# Patient Record
Sex: FEMALE | Race: WHITE | NOT HISPANIC OR LATINO | URBAN - METROPOLITAN AREA
[De-identification: names, ages, dates, MRNs, and addresses within clinical notes are randomized per-mention and may not be internally consistent; named-entity substitution may affect disease eponyms.]

---

## 2017-08-25 ENCOUNTER — APPOINTMENT (RX ONLY)
Dept: URBAN - METROPOLITAN AREA CLINIC 116 | Facility: CLINIC | Age: 37
Setting detail: DERMATOLOGY
End: 2017-08-25

## 2017-08-25 DIAGNOSIS — Z71.89 OTHER SPECIFIED COUNSELING: ICD-10-CM

## 2017-08-25 DIAGNOSIS — Z80.8 FAMILY HISTORY OF MALIGNANT NEOPLASM OF OTHER ORGANS OR SYSTEMS: ICD-10-CM

## 2017-08-25 DIAGNOSIS — L91.8 OTHER HYPERTROPHIC DISORDERS OF THE SKIN: ICD-10-CM

## 2017-08-25 DIAGNOSIS — D22 MELANOCYTIC NEVI: ICD-10-CM

## 2017-08-25 PROBLEM — D22.5 MELANOCYTIC NEVI OF TRUNK: Status: ACTIVE | Noted: 2017-08-25

## 2017-08-25 PROBLEM — D22.61 MELANOCYTIC NEVI OF RIGHT UPPER LIMB, INCLUDING SHOULDER: Status: ACTIVE | Noted: 2017-08-25

## 2017-08-25 PROCEDURE — ? OBSERVATION

## 2017-08-25 PROCEDURE — ? COUNSELING

## 2017-08-25 PROCEDURE — 11200 RMVL SKIN TAGS UP TO&INC 15: CPT | Mod: NMN

## 2017-08-25 PROCEDURE — ? SKIN TAG REMOVAL

## 2017-08-25 PROCEDURE — 99203 OFFICE O/P NEW LOW 30 MIN: CPT | Mod: 25

## 2017-08-25 ASSESSMENT — LOCATION DETAILED DESCRIPTION DERM
LOCATION DETAILED: LEFT MEDIAL BREAST 9-10:00 REGION
LOCATION DETAILED: RIGHT AXILLARY VAULT
LOCATION DETAILED: EPIGASTRIC SKIN
LOCATION DETAILED: RIGHT DISTAL DORSAL SMALL FINGER
LOCATION DETAILED: LEFT AXILLARY VAULT
LOCATION DETAILED: LEFT INFERIOR POSTERIOR NECK
LOCATION DETAILED: RIGHT INFERIOR MEDIAL UPPER BACK

## 2017-08-25 ASSESSMENT — LOCATION ZONE DERM
LOCATION ZONE: FINGER
LOCATION ZONE: NECK
LOCATION ZONE: TRUNK
LOCATION ZONE: AXILLAE

## 2017-08-25 ASSESSMENT — LOCATION SIMPLE DESCRIPTION DERM
LOCATION SIMPLE: NECK
LOCATION SIMPLE: LEFT AXILLARY VAULT
LOCATION SIMPLE: LEFT BREAST
LOCATION SIMPLE: ABDOMEN
LOCATION SIMPLE: RIGHT UPPER BACK
LOCATION SIMPLE: RIGHT AXILLARY VAULT
LOCATION SIMPLE: RIGHT SMALL FINGER

## 2017-08-25 NOTE — PROCEDURE: SKIN TAG REMOVAL
Anesthesia Volume In Cc: 1
Medical Necessity Information: It is in your best interest to select a reason for this procedure from the list below. All of these items fulfill various CMS LCD requirements except the new and changing color options.
Include Z78.9 (Other Specified Conditions Influencing Health Status) As An Associated Diagnosis?: Yes
Detail Level: Detailed
Hemostasis: Aluminum Chloride
Medical Necessity Clause: This procedure was medically necessary because the lesions that were treated were:
Anesthesia Type: 1% lidocaine with epinephrine
Consent: Written consent obtained and the risks of skin tag removal was reviewed with the patient including but not limited to bleeding, pigmentary change, infection, pain, and remote possibility of scarring.

## 2017-08-25 NOTE — PROCEDURE: MIPS QUALITY
Quality 130: Documentation Of Current Medications In The Medical Record: Current Medications Documented
Quality 226: Preventive Care And Screening: Tobacco Use: Screening And Cessation Intervention: Patient screened for tobacco and never smoked
Quality 111:Pneumonia Vaccination Status For Older Adults: Pneumococcal Vaccination not Administered or Previously Received, Reason not Otherwise Specified
Quality 47: Advance Care Plan: Advance Care Planning discussed and documented in the medical record; patient did not wish or was not able to name a surrogate decision maker or provide an advance care plan.
Detail Level: Detailed
Quality 110: Preventive Care And Screening: Influenza Immunization: Influenza immunization was not ordered or administered, reason not given
Quality 131: Pain Assessment And Follow-Up: Pain assessment using a standardized tool is documented as negative, no follow-up plan required
Quality 431: Preventive Care And Screening: Unhealthy Alcohol Use - Screening: Patient screened for unhealthy alcohol use using a single question and scores 2 or greater episodes per year and brief intervention did not occur

## 2017-08-25 NOTE — HPI: EVALUATION OF SKIN LESION(S)
How Severe Are Your Spot(S)?: moderate
Have Your Spot(S) Been Treated In The Past?: has not been treated
Hpi Title: Evaluation of Skin Lesions
Family Member: Mother, father, aunts and uncles

## 2018-07-24 ENCOUNTER — APPOINTMENT (RX ONLY)
Dept: URBAN - METROPOLITAN AREA CLINIC 43 | Facility: CLINIC | Age: 38
Setting detail: DERMATOLOGY
End: 2018-07-24

## 2018-07-24 DIAGNOSIS — D22 MELANOCYTIC NEVI: ICD-10-CM

## 2018-07-24 DIAGNOSIS — D485 NEOPLASM OF UNCERTAIN BEHAVIOR OF SKIN: ICD-10-CM

## 2018-07-24 DIAGNOSIS — Z80.8 FAMILY HISTORY OF MALIGNANT NEOPLASM OF OTHER ORGANS OR SYSTEMS: ICD-10-CM

## 2018-07-24 PROBLEM — D48.5 NEOPLASM OF UNCERTAIN BEHAVIOR OF SKIN: Status: ACTIVE | Noted: 2018-07-24

## 2018-07-24 PROBLEM — D22.5 MELANOCYTIC NEVI OF TRUNK: Status: ACTIVE | Noted: 2018-07-24

## 2018-07-24 PROCEDURE — ? COUNSELING

## 2018-07-24 PROCEDURE — ? BIOPSY BY SHAVE METHOD

## 2018-07-24 PROCEDURE — 99214 OFFICE O/P EST MOD 30 MIN: CPT | Mod: 25

## 2018-07-24 PROCEDURE — 11100: CPT

## 2018-07-24 ASSESSMENT — LOCATION DETAILED DESCRIPTION DERM
LOCATION DETAILED: RIGHT INFERIOR MEDIAL UPPER BACK
LOCATION DETAILED: RIGHT PROXIMAL PRETIBIAL REGION
LOCATION DETAILED: EPIGASTRIC SKIN

## 2018-07-24 ASSESSMENT — LOCATION SIMPLE DESCRIPTION DERM
LOCATION SIMPLE: RIGHT UPPER BACK
LOCATION SIMPLE: RIGHT PRETIBIAL REGION
LOCATION SIMPLE: ABDOMEN

## 2018-07-24 ASSESSMENT — LOCATION ZONE DERM
LOCATION ZONE: TRUNK
LOCATION ZONE: LEG

## 2018-07-24 NOTE — PROCEDURE: BIOPSY BY SHAVE METHOD
Dressing: bandage
Notification Instructions: Patient will be notified of biopsy results. However, patient instructed to call the office if not contacted within 2 weeks.
Hemostasis: Drysol
Path Notes (To The Dermatopathologist): 0.5cm
Electrodesiccation And Curettage Text: The wound bed was treated with electrodesiccation and curettage after the biopsy was performed.
Post-Care Instructions: I reviewed with the patient in detail post-care instructions. Patient is to keep the biopsy site dry overnight, and then apply bacitracin twice daily until healed. Patient may apply hydrogen peroxide soaks to remove any crusting.
Destruction After The Procedure: No
Additional Anesthesia Volume In Cc (Will Not Render If 0): 0
Electrodesiccation Text: The wound bed was treated with electrodesiccation after the biopsy was performed.
Biopsy Method: Dermablade
Biopsy Type: H and E
Detail Level: Detailed
Lab: [id number] [facility] [address]
Type Of Destruction Used: Curettage
Silver Nitrate Text: The wound bed was treated with silver nitrate after the biopsy was performed.
Anesthesia Volume In Cc (Will Not Render If 0): 0.5
Cryotherapy Text: The wound bed was treated with cryotherapy after the biopsy was performed.
Depth Of Biopsy: dermis
Billing Type: [employer]
Anesthesia Type: 1% lidocaine with epinephrine
Consent: Written consent was obtained and risks were reviewed including but not limited to scarring, infection, bleeding, scabbing, incomplete removal, nerve damage and allergy to anesthesia.
Wound Care: Aquaphor
Was A Bandage Applied: Yes

## 2019-07-30 ENCOUNTER — APPOINTMENT (RX ONLY)
Dept: URBAN - METROPOLITAN AREA CLINIC 116 | Facility: CLINIC | Age: 39
Setting detail: DERMATOLOGY
End: 2019-07-30

## 2019-07-30 DIAGNOSIS — Z80.8 FAMILY HISTORY OF MALIGNANT NEOPLASM OF OTHER ORGANS OR SYSTEMS: ICD-10-CM

## 2019-07-30 DIAGNOSIS — L81.4 OTHER MELANIN HYPERPIGMENTATION: ICD-10-CM

## 2019-07-30 DIAGNOSIS — Z71.89 OTHER SPECIFIED COUNSELING: ICD-10-CM

## 2019-07-30 DIAGNOSIS — L30.1 DYSHIDROSIS [POMPHOLYX]: ICD-10-CM

## 2019-07-30 DIAGNOSIS — D22 MELANOCYTIC NEVI: ICD-10-CM

## 2019-07-30 PROBLEM — L30.9 DERMATITIS, UNSPECIFIED: Status: ACTIVE | Noted: 2019-07-30

## 2019-07-30 PROBLEM — D22.5 MELANOCYTIC NEVI OF TRUNK: Status: ACTIVE | Noted: 2019-07-30

## 2019-07-30 PROCEDURE — ? COUNSELING

## 2019-07-30 PROCEDURE — ? TREATMENT REGIMEN

## 2019-07-30 PROCEDURE — ? PRESCRIPTION

## 2019-07-30 PROCEDURE — 99214 OFFICE O/P EST MOD 30 MIN: CPT

## 2019-07-30 RX ORDER — CLOBETASOL PROPIONATE 0.5 MG/G
CREAM TOPICAL
Qty: 1 | Refills: 1 | Status: ERX | COMMUNITY
Start: 2019-07-30

## 2019-07-30 RX ADMIN — CLOBETASOL PROPIONATE 1: 0.5 CREAM TOPICAL at 00:00

## 2019-07-30 ASSESSMENT — LOCATION ZONE DERM
LOCATION ZONE: FEET
LOCATION ZONE: TRUNK

## 2019-07-30 ASSESSMENT — LOCATION DETAILED DESCRIPTION DERM
LOCATION DETAILED: RIGHT INFERIOR MEDIAL UPPER BACK
LOCATION DETAILED: LEFT HEEL
LOCATION DETAILED: EPIGASTRIC SKIN
LOCATION DETAILED: LOWER STERNUM
LOCATION DETAILED: RIGHT HEEL

## 2019-07-30 ASSESSMENT — LOCATION SIMPLE DESCRIPTION DERM
LOCATION SIMPLE: RIGHT HEEL
LOCATION SIMPLE: ABDOMEN
LOCATION SIMPLE: CHEST
LOCATION SIMPLE: RIGHT UPPER BACK
LOCATION SIMPLE: LEFT HEEL

## 2019-07-30 ASSESSMENT — SEVERITY ASSESSMENT: SEVERITY: MILD TO MODERATE

## 2019-07-30 NOTE — HPI: RASH
How Severe Is Your Rash?: mild
Is This A New Presentation, Or A Follow-Up?: Rash
Additional History: Patient states rash comes and goes

## 2019-07-30 NOTE — PROCEDURE: MIPS QUALITY
Additional Notes: Patient states on a scale of 0-10.  Pain level is 0. \\nMedication list detail not given
Quality 131: Pain Assessment And Follow-Up: Pain assessment using a standardized tool is documented as negative, no follow-up plan required
Quality 130: Documentation Of Current Medications In The Medical Record: Eligible clinician attests to documenting in the medical record the patient is not eligible for a current list of medications being obtained, updated, or reviewed by the eligible clinician
Detail Level: Detailed

## 2021-03-16 ENCOUNTER — APPOINTMENT (RX ONLY)
Dept: URBAN - METROPOLITAN AREA CLINIC 121 | Facility: CLINIC | Age: 41
Setting detail: DERMATOLOGY
End: 2021-03-16

## 2021-03-16 DIAGNOSIS — L20.89 OTHER ATOPIC DERMATITIS: ICD-10-CM | Status: STABLE

## 2021-03-16 DIAGNOSIS — L71.8 OTHER ROSACEA: ICD-10-CM

## 2021-03-16 PROBLEM — D23.5 OTHER BENIGN NEOPLASM OF SKIN OF TRUNK: Status: ACTIVE | Noted: 2021-03-16

## 2021-03-16 PROBLEM — L20.84 INTRINSIC (ALLERGIC) ECZEMA: Status: ACTIVE | Noted: 2021-03-16

## 2021-03-16 PROCEDURE — 99214 OFFICE O/P EST MOD 30 MIN: CPT

## 2021-03-16 PROCEDURE — ? COUNSELING

## 2021-03-16 PROCEDURE — ? PRESCRIPTION MEDICATION MANAGEMENT

## 2021-03-16 PROCEDURE — ? PRESCRIPTION

## 2021-03-16 RX ORDER — CLOBETASOL PROPIONATE 0.5 MG/G
CREAM TOPICAL BID
Qty: 1 | Refills: 4 | Status: ERX

## 2021-03-16 RX ORDER — METRONIDAZOLE 7.5 MG/ML
LOTION TOPICAL QD
Qty: 1 | Refills: 2 | Status: ERX | COMMUNITY
Start: 2021-03-16

## 2021-03-16 RX ADMIN — METRONIDAZOLE 1: 7.5 LOTION TOPICAL at 00:00

## 2021-03-16 ASSESSMENT — LOCATION DETAILED DESCRIPTION DERM: LOCATION DETAILED: RIGHT CENTRAL MALAR CHEEK

## 2021-03-16 ASSESSMENT — LOCATION ZONE DERM: LOCATION ZONE: FACE

## 2021-03-16 ASSESSMENT — LOCATION SIMPLE DESCRIPTION DERM: LOCATION SIMPLE: RIGHT CHEEK

## 2021-03-16 NOTE — PROCEDURE: PRESCRIPTION MEDICATION MANAGEMENT
Detail Level: Zone
Render In Strict Bullet Format?: No
Initiate Treatment: metronidazole 0.75 % lotion Qd\\nDays Supply: 30\\nSig: Apply to face daily
Initiate Treatment: clobetasol 0.05 % topical cream Bid\\nDays Supply: 30\\nSig: Apply to affected area twice a day x 2 weeks then break for 2 weeks Resume for flares

## 2022-03-15 ENCOUNTER — APPOINTMENT (RX ONLY)
Dept: URBAN - METROPOLITAN AREA CLINIC 121 | Facility: CLINIC | Age: 42
Setting detail: DERMATOLOGY
End: 2022-03-15

## 2022-03-15 DIAGNOSIS — Z80.8 FAMILY HISTORY OF MALIGNANT NEOPLASM OF OTHER ORGANS OR SYSTEMS: ICD-10-CM

## 2022-03-15 DIAGNOSIS — H01.13 ECZEMATOUS DERMATITIS OF EYELID: ICD-10-CM

## 2022-03-15 DIAGNOSIS — L81.4 OTHER MELANIN HYPERPIGMENTATION: ICD-10-CM

## 2022-03-15 DIAGNOSIS — L20.89 OTHER ATOPIC DERMATITIS: ICD-10-CM

## 2022-03-15 DIAGNOSIS — L71.8 OTHER ROSACEA: ICD-10-CM | Status: WELL CONTROLLED

## 2022-03-15 PROBLEM — H01.134 ECZEMATOUS DERMATITIS OF LEFT UPPER EYELID: Status: ACTIVE | Noted: 2022-03-15

## 2022-03-15 PROBLEM — L20.84 INTRINSIC (ALLERGIC) ECZEMA: Status: ACTIVE | Noted: 2022-03-15

## 2022-03-15 PROCEDURE — ? PRESCRIPTION MEDICATION MANAGEMENT

## 2022-03-15 PROCEDURE — ? PRESCRIPTION

## 2022-03-15 PROCEDURE — ? SUNSCREEN RECOMMENDATIONS

## 2022-03-15 PROCEDURE — 99214 OFFICE O/P EST MOD 30 MIN: CPT

## 2022-03-15 PROCEDURE — ? COUNSELING

## 2022-03-15 RX ORDER — HYDROCORTISONE 25 MG/G
OINTMENT TOPICAL BID
Qty: 28.35 | Refills: 1 | Status: ERX | COMMUNITY
Start: 2022-03-15

## 2022-03-15 RX ADMIN — HYDROCORTISONE 1: 25 OINTMENT TOPICAL at 00:00

## 2022-03-15 ASSESSMENT — LOCATION DETAILED DESCRIPTION DERM
LOCATION DETAILED: LEFT LATERAL SUPERIOR EYELID
LOCATION DETAILED: RIGHT POSTERIOR SHOULDER

## 2022-03-15 ASSESSMENT — LOCATION ZONE DERM
LOCATION ZONE: EYELID
LOCATION ZONE: ARM

## 2022-03-15 ASSESSMENT — LOCATION SIMPLE DESCRIPTION DERM
LOCATION SIMPLE: RIGHT SHOULDER
LOCATION SIMPLE: LEFT SUPERIOR EYELID

## 2022-03-15 NOTE — HPI: EVALUATION OF SKIN LESION(S)
What Type Of Note Output Would You Prefer (Optional)?: Standard Output
How Severe Are Your Spot(S)?: mild
Have Your Spot(S) Been Treated In The Past?: has not been treated
Hpi Title: Evaluation of Skin Lesions
Family Member: Gabbi Turner

## 2022-03-15 NOTE — PROCEDURE: PRESCRIPTION MEDICATION MANAGEMENT
Render In Strict Bullet Format?: No
Samples Given: Aquaphor healing ointment
Detail Level: Zone
Initiate Treatment: Hydrocortisone 2.5% lotion, apply to [facility] areas bid for two weeks on and two weeks off.
Continue Regimen: Metronidazole cream as directed.
Plan: Pt will call for refills if needed.
Continue Regimen: Clobetasol cream as directed.

## 2022-10-14 ENCOUNTER — RX ONLY (OUTPATIENT)
Age: 42
Setting detail: RX ONLY
End: 2022-10-14

## 2022-10-14 RX ORDER — CLOBETASOL PROPIONATE 0.5 MG/G
1 CREAM TOPICAL BID
Qty: 60 | Refills: 3 | Status: ERX

## 2022-10-14 RX ORDER — METRONIDAZOLE 7.5 MG/ML
1 LOTION TOPICAL QD
Qty: 59 | Refills: 3 | Status: ERX

## 2022-10-27 ENCOUNTER — APPOINTMENT (RX ONLY)
Dept: URBAN - METROPOLITAN AREA CLINIC 121 | Facility: CLINIC | Age: 42
Setting detail: DERMATOLOGY
End: 2022-10-27

## 2022-10-27 ENCOUNTER — RX ONLY (OUTPATIENT)
Age: 42
Setting detail: RX ONLY
End: 2022-10-27

## 2022-10-27 DIAGNOSIS — I87.2 VENOUS INSUFFICIENCY (CHRONIC) (PERIPHERAL): ICD-10-CM | Status: INADEQUATELY CONTROLLED

## 2022-10-27 PROCEDURE — ? RECOMMENDATIONS

## 2022-10-27 PROCEDURE — ? PRESCRIPTION

## 2022-10-27 PROCEDURE — ? MEDICAL CONSULTATION: VENOUS DISEASE

## 2022-10-27 PROCEDURE — ? COMPRESSION STOCKING FITTING

## 2022-10-27 PROCEDURE — 99204 OFFICE O/P NEW MOD 45 MIN: CPT

## 2022-10-27 RX ORDER — PEN NEEDLE, DIABETIC 32GX 5/32"
NEEDLE, DISPOSABLE MISCELLANEOUS
Qty: 1 | Refills: 0 | COMMUNITY
Start: 2022-10-27

## 2022-10-27 RX ADMIN — Medication: at 00:00

## 2022-10-27 ASSESSMENT — LOCATION ZONE DERM: LOCATION ZONE: LEG

## 2022-10-27 ASSESSMENT — LOCATION DETAILED DESCRIPTION DERM
LOCATION DETAILED: RIGHT PROXIMAL PRETIBIAL REGION
LOCATION DETAILED: LEFT DISTAL PRETIBIAL REGION

## 2022-10-27 ASSESSMENT — LOCATION SIMPLE DESCRIPTION DERM
LOCATION SIMPLE: RIGHT PRETIBIAL REGION
LOCATION SIMPLE: LEFT PRETIBIAL REGION

## 2022-10-27 NOTE — PROCEDURE: MEDICAL CONSULTATION: VENOUS DISEASE
Left Leg Varicose Veins: 0- None
Left Leg Circumference: medium
Include Left Vcss In Note: Yes
Right Leg Venous Hyperpigmentation: 0- None or focal low intensity (tan)
Right Dorsalis Pedis Pulse: not performed
Follow Up Instructions:: Patient will follow up after the bilateral duplex ultrasound venous reflux study to review the results and finalize treatment plan. The patient must wear compression stockings. Preventive strategies include weight loss through diet and exercise and toning leg muscles. A venous fact sheet was given, which reviews venous anatomy/pathophysiology and treatment options. The pathophysiology of venous disease and potential treatment options were discussed in detail, especially the non-FDA status of foam sclerotherapy with its risks benefits and alternatives. The patient's questions were answered in full.
Right Leg Compression Therapy: 0- None or noncompliant
Include Vcss In The Note?: No
Detail Level: Zone

## 2022-10-27 NOTE — PROCEDURE: COMPRESSION STOCKING FITTING
Left Thigh: 58 cm
Right Below Knee: 43 cm
Left Ankle: [facility]
Pantihose Type: Part A
Order Date: 10/27/22
Right Thigh: 59 cm
Left Below Knee: [address]
Stocking Type: Stockings
Strength: 20-30 mmHg
Detail Level: Simple
Toe Type: Open
Right Ankle: 24.5 cm

## 2022-10-27 NOTE — HPI: VEIN EVALUATION
Do You Have A Family History Of Vein Disease?: yes
How Severe Is/Are Your Symptoms?: moderate
Is This A New Presentation, Or A Follow-Up?: Vein Evaluation
Additional History: Hx brain mass, seizures, chronic migraines (taking Ubrelvy and another injectable medication)
Family History Of Vein Disease (Include Family Member And Type Of Vein Disease):: Grandmother, Mother, Norm Galeazzi
Referred By:: Self

## 2022-10-27 NOTE — PROCEDURE: RECOMMENDATIONS
Detail Level: Zone
Recommendations (Free Text): Pt advised to wear compression stockings as prescribed
Recommendation Preamble: The following recommendations were made during the visit:
Render Risk Assessment In Note?: no

## 2022-11-10 ENCOUNTER — APPOINTMENT (RX ONLY)
Dept: URBAN - METROPOLITAN AREA CLINIC 121 | Facility: CLINIC | Age: 42
Setting detail: DERMATOLOGY
End: 2022-11-10

## 2022-11-10 DIAGNOSIS — I87.2 VENOUS INSUFFICIENCY (CHRONIC) (PERIPHERAL): ICD-10-CM

## 2022-11-10 PROCEDURE — ? LOWER EXTREMITY DOPPLER US

## 2022-11-10 PROCEDURE — 93970 EXTREMITY STUDY: CPT

## 2022-11-10 NOTE — PROCEDURE: LOWER EXTREMITY DOPPLER US
Right Intraluminal Thrombus- Yes: The right deep veins were imaged from the level of the common femoral vein to the posterior tibial veins. There was evidence of intraluminal thrombus as noted above.
Recommend Sclerotherapy With Ultrasound Guidance On Left Side: No
Add Milliseconds Of Reflux To Note?: Yes
Left Intraluminal Thrombus- Yes: The left deep veins were imaged from the level of the common femoral vein to the posterior tibial veins. There was evidence of intraluminal thrombus as noted above.
Detail Level: Detailed
Right Intraluminal Thrombus- No: The right deep veins were imaged from the level of the common femoral vein to the posterior tibial veins. All deep veins demonstrated compressibility without evidence of intraluminal thrombus.
Continue Conservative Therapy Text: Continue conservative treatment (such as compression stockings, OTC analgesics, and exercise) and consider intervention if no change or worsening symptoms to varicosities.
Size Options: Use Range
Left Intraluminal Thrombus- No: The left deep veins were imaged from the level of the common femoral vein to the posterior tibial veins. All deep veins demonstrated compressibility without evidence of intraluminal thrombus.

## 2022-12-01 ENCOUNTER — APPOINTMENT (RX ONLY)
Dept: URBAN - METROPOLITAN AREA CLINIC 121 | Facility: CLINIC | Age: 42
Setting detail: DERMATOLOGY
End: 2022-12-01

## 2022-12-01 DIAGNOSIS — I87.2 VENOUS INSUFFICIENCY (CHRONIC) (PERIPHERAL): ICD-10-CM | Status: WORSENING

## 2022-12-01 PROCEDURE — 99212 OFFICE O/P EST SF 10 MIN: CPT

## 2022-12-01 PROCEDURE — ? MEDICAL CONSULTATION: VENOUS DISEASE

## 2022-12-01 NOTE — PROCEDURE: MEDICAL CONSULTATION: VENOUS DISEASE
Left Leg Venous Edema: 0- None
Left Dorsalis Pedis Pulse: 2 (Easily palpable)
Right Leg Venous Hyperpigmentation: 0- None or focal low intensity (tan)
Detail Level: Zone
Include Left Vcss In Note: Yes
Right Leg: Peripheral Vascular Disease?: No
Right Leg Compression Therapy: 0- None or noncompliant
Right Leg Circumference: medium
Limitations: Pt gets cramps at night, unable to enjoy daily activities due to tightness, aching, and discomfort in both legs.  Symptoms persist despite conservative therapies (leg elevation, compression stockings)
Follow Up Instructions:: Patient will follow up after the bilateral duplex ultrasound venous reflux study to review the results and finalize treatment plan. The patient must wear compression stockings. Preventive strategies include weight loss through diet and exercise and toning leg muscles. A venous fact sheet was given, which reviews venous anatomy/pathophysiology and treatment options. The pathophysiology of venous disease and potential treatment options were discussed in detail, with its risks, benefits and alternatives. The patient's questions were answered in full.

## 2023-02-14 ENCOUNTER — RX ONLY (OUTPATIENT)
Age: 43
Setting detail: RX ONLY
End: 2023-02-14

## 2023-02-16 ENCOUNTER — APPOINTMENT (RX ONLY)
Dept: URBAN - METROPOLITAN AREA CLINIC 121 | Facility: CLINIC | Age: 43
Setting detail: DERMATOLOGY
End: 2023-02-16

## 2023-02-16 DIAGNOSIS — I87.2 VENOUS INSUFFICIENCY (CHRONIC) (PERIPHERAL): ICD-10-CM | Status: WORSENING

## 2023-02-16 PROCEDURE — 99214 OFFICE O/P EST MOD 30 MIN: CPT

## 2023-02-16 PROCEDURE — ? PATIENT SPECIFIC COUNSELING

## 2023-02-16 PROCEDURE — ? VENOUS CLINICAL SEVERITY SCORE

## 2023-02-16 PROCEDURE — ? RECOMMENDATIONS

## 2023-02-16 PROCEDURE — ? VEIN TREATMENT PLAN

## 2023-02-16 PROCEDURE — ? CEAP CLASSIFICATION

## 2023-02-16 ASSESSMENT — LOCATION ZONE DERM: LOCATION ZONE: LEG

## 2023-02-16 ASSESSMENT — LOCATION SIMPLE DESCRIPTION DERM
LOCATION SIMPLE: RIGHT PRETIBIAL REGION
LOCATION SIMPLE: LEFT PRETIBIAL REGION

## 2023-02-16 ASSESSMENT — LOCATION DETAILED DESCRIPTION DERM
LOCATION DETAILED: LEFT DISTAL PRETIBIAL REGION
LOCATION DETAILED: RIGHT PROXIMAL PRETIBIAL REGION

## 2023-02-16 NOTE — HPI: VEIN EVALUATION
Which Leg Is Worse?: Left
How Severe Is/Are Your Symptoms?: moderate
Is This A New Presentation, Or A Follow-Up?: Follow Up Venous Evaluation

## 2023-02-16 NOTE — PROCEDURE: VENOUS CLINICAL SEVERITY SCORE
Right Leg Inflammation: 0- None
Left Leg Pigmentation: 0- None or focal low intensity (tan)
Include Right Vcss In Note: Yes
Right Leg Varicose Veins: 1- Few, scattered: branch varicose veins
Left Leg Venous Edema: 2- Afternoon edema above ankle
Right Leg Compression Therapy: 2- Wears elastic stocking most days
Detail Level: Simple
Right Leg Pain: 2- Daily, moderate activity limitation, occasional analgesics

## 2023-02-16 NOTE — PROCEDURE: VEIN TREATMENT PLAN
Micah Sessions - Left: 1
Ugs Sessions - Left: 2
Detail Level: Simple
Intro Statement (Will Not Render If Left Blank): The patient has signs and symptoms of chronic venous hypertension affecting daily function with US of the lower extremities demonstrating venous insufficiency. Extensive discussion and education was undertaken during the office visit regarding pathophysiology, chronicity and long term prognosis of venous disease. We also discussed risk factors for venous hypertension, diagnostic testing and treatment. Our treatment discussion included conservative management and interventional procedure options with an in-depth explanation of the procedures, recommendations and expected follow-up. All questions were answered and no further questions were verbalized by the patient at this time.
Closing Statement (Will Not Render If Left Blank): We discussed all treatment options. Risks and benefits of each procedure were discussed. These include but are not limited to: deep vein thrombosis, pulmonary embolism, paresthesia, phlebitis, infection, bleeding, and visible scarring. After the risks and benefits were reviewed with the patient and all of their questions were answered they decided to move forward with treatment. A patient education booklet was given and pre/post procedure instructions were reviewed with the patient.

## 2023-02-16 NOTE — PROCEDURE: RECOMMENDATIONS
Recommendation Preamble: The following recommendations were made during the visit:
Patient Management Risk Assessment: Moderate
Detail Level: Zone
Recommendations (Free Text): Patient has been instructed on pre/post procedure care instructions and potential risks. \\nPatient will schedule for procedure in 4 weeks to allow healing of a sprained metatarsal and to allow time for her upcoming vacation. \\nAfter discussion it was determined that patient is not able to continue wearing compression stockings daily due to her daily activities and job requirements which involves walking through mud and for long periods. \\nIt was decided that we will begin treatment on the RT GSV. \\nPatient will need large thigh high compression stockings as she is currently using knee high (will purchase on procedure day)\\nAuthorization is on file and expires 7/4/2023.
Render Risk Assessment In Note?: yes

## 2023-03-14 ENCOUNTER — APPOINTMENT (RX ONLY)
Dept: URBAN - METROPOLITAN AREA CLINIC 121 | Facility: CLINIC | Age: 43
Setting detail: DERMATOLOGY
End: 2023-03-14

## 2023-03-14 DIAGNOSIS — Z80.8 FAMILY HISTORY OF MALIGNANT NEOPLASM OF OTHER ORGANS OR SYSTEMS: ICD-10-CM

## 2023-03-14 DIAGNOSIS — D49.2 NEOPLASM OF UNSPECIFIED BEHAVIOR OF BONE, SOFT TISSUE, AND SKIN: ICD-10-CM

## 2023-03-14 DIAGNOSIS — L71.8 OTHER ROSACEA: ICD-10-CM | Status: WELL CONTROLLED

## 2023-03-14 DIAGNOSIS — L81.4 OTHER MELANIN HYPERPIGMENTATION: ICD-10-CM

## 2023-03-14 DIAGNOSIS — L20.89 OTHER ATOPIC DERMATITIS: ICD-10-CM | Status: WELL CONTROLLED

## 2023-03-14 PROBLEM — L20.84 INTRINSIC (ALLERGIC) ECZEMA: Status: ACTIVE | Noted: 2023-03-14

## 2023-03-14 PROCEDURE — ? SUNSCREEN RECOMMENDATIONS

## 2023-03-14 PROCEDURE — ? BIOPSY BY SHAVE METHOD

## 2023-03-14 PROCEDURE — 11102 TANGNTL BX SKIN SINGLE LES: CPT

## 2023-03-14 PROCEDURE — ? PRESCRIPTION MEDICATION MANAGEMENT

## 2023-03-14 PROCEDURE — ? COUNSELING

## 2023-03-14 PROCEDURE — 99214 OFFICE O/P EST MOD 30 MIN: CPT | Mod: 25

## 2023-03-14 ASSESSMENT — LOCATION DETAILED DESCRIPTION DERM
LOCATION DETAILED: RIGHT POSTERIOR SHOULDER
LOCATION DETAILED: LEFT LATERAL UPPER BACK

## 2023-03-14 ASSESSMENT — LOCATION SIMPLE DESCRIPTION DERM
LOCATION SIMPLE: LEFT UPPER BACK
LOCATION SIMPLE: RIGHT SHOULDER

## 2023-03-14 ASSESSMENT — LOCATION ZONE DERM
LOCATION ZONE: ARM
LOCATION ZONE: TRUNK

## 2023-03-14 NOTE — PROCEDURE: PRESCRIPTION MEDICATION MANAGEMENT
Detail Level: Zone
Render In Strict Bullet Format?: No
Continue Regimen: Metronidazole cream as directed.
Plan: Pt will call for refills if needed.
Continue Regimen: Clobetasol cream as directed.

## 2023-03-14 NOTE — HPI: EVALUATION OF SKIN LESION(S)
What Type Of Note Output Would You Prefer (Optional)?: Standard Output
How Severe Are Your Spot(S)?: mild
Have Your Spot(S) Been Treated In The Past?: has not been treated
Hpi Title: Evaluation of Skin Lesions
Family Member: Mother, Ildalandon Rosas

## 2023-03-14 NOTE — HPI: SKIN LESION
How Severe Is Your Skin Lesion?: mild
Has Your Skin Lesion Been Treated?: not been treated
Is This A New Presentation, Or A Follow-Up?: Skin Lesion
Which Family Member (Optional)?: Cindy Montes

## 2023-03-14 NOTE — PROCEDURE: BIOPSY BY SHAVE METHOD
Detail Level: Detailed
Depth Of Biopsy: dermis
Was A Bandage Applied: Yes
Size Of Lesion In Cm: 0.9
X Size Of Lesion In Cm: 0
Biopsy Type: H and E
Biopsy Method: Personna blade
Anesthesia Type: 1% lidocaine with epinephrine
Anesthesia Volume In Cc (Will Not Render If 0): 0.5
Hemostasis: Aluminum Chloride
Wound Care: Vaseline
Dressing: pressure dressing with telfa
Destruction After The Procedure: No
Type Of Destruction Used: Curettage
Cryotherapy Text: The wound bed was treated with cryotherapy after the biopsy was performed.
Electrodesiccation Text: The wound bed was treated with electrodesiccation after the biopsy was performed.
Electrodesiccation And Curettage Text: The wound bed was treated with electrodesiccation and curettage after the biopsy was performed.
Silver Nitrate Text: The wound bed was treated with silver nitrate after the biopsy was performed.
Lab: [facility]
Lab Facility: [address]
Path Notes (To The Dermatopathologist): Size is 0.8 cm
Consent: The provider's intent is to obtain a tissue sample solely for diagnostic purposes. Written consent to obtain tissue sample was obtained and risks were reviewed including but not limited to scarring, infection, bleeding, scabbing, incomplete removal, nerve damage and allergy to anesthesia.
Post-Care Instructions: Clean the wound with antibacterial soap and water twice a day. If crust develops, soak with moist gauze. Apply a thin layer of Vaseline to the area twice a day until wound is healed. Cover with a clean band-aid\\nBleeding is rare, but if it should occur, apply direct pressure to the bleeding site for a full 15 minutes without easing up. If the bleeding continues, despite your efforts, please call the office. If it is after hours, call [phone] to speak to a provider. It may be necessary to go to the emergency room. Patient also received handout.
Notification Instructions: Patient will be notified of biopsy results. However, patient instructed to call the office if not contacted within 2 weeks.
Billing Type: [employer]
Information: Selecting Yes will display possible errors in your note based on the variables you have selected. This validation is only offered as a suggestion for you. PLEASE NOTE THAT THE VALIDATION TEXT WILL BE REMOVED WHEN YOU FINALIZE YOUR NOTE. IF YOU WANT TO FAX A PRELIMINARY NOTE YOU WILL NEED TO TOGGLE THIS TO 'NO' IF YOU DO NOT WANT IT IN YOUR FAXED NOTE.

## 2023-03-16 ENCOUNTER — APPOINTMENT (RX ONLY)
Dept: URBAN - METROPOLITAN AREA CLINIC 121 | Facility: CLINIC | Age: 43
Setting detail: DERMATOLOGY
End: 2023-03-16

## 2023-03-16 DIAGNOSIS — I87.2 VENOUS INSUFFICIENCY (CHRONIC) (PERIPHERAL): ICD-10-CM

## 2023-03-16 PROCEDURE — ? ENDOVENOUS ABLATION

## 2023-03-16 PROCEDURE — ? RECOMMENDATIONS

## 2023-03-16 PROCEDURE — ? COMPRESSION STOCKING FITTING

## 2023-03-16 PROCEDURE — 36475 ENDOVENOUS RF 1ST VEIN: CPT | Mod: RT

## 2023-03-16 ASSESSMENT — LOCATION DETAILED DESCRIPTION DERM: LOCATION DETAILED: RIGHT ANTERIOR DISTAL THIGH

## 2023-03-16 ASSESSMENT — LOCATION ZONE DERM: LOCATION ZONE: LEG

## 2023-03-16 ASSESSMENT — LOCATION SIMPLE DESCRIPTION DERM: LOCATION SIMPLE: RIGHT THIGH

## 2023-03-16 NOTE — PROCEDURE: COMPRESSION STOCKING FITTING
Left Ankle: 25 cm
Right Below Knee: 45 cm
Specific Size Ordered: thigh high, XL
Left Thigh: 75 cm
Detail Level: Simple
Strength: 20-30 mmHg
Toe Type: Open
Stocking Type: Stockings
Order Date: 3/16/23
Right Thigh: 74 cm
Pantihose Type: Part A

## 2023-03-16 NOTE — PROCEDURE: RECOMMENDATIONS
Render Risk Assessment In Note?: no
Recommendation Preamble: The following recommendations were made during the visit:
Recommendations (Free Text): Compression dressings and stockings were placed on patient post-operatively. \\nPost care instructions were given to patient verbally and in written form. \\nPatient will schedule for limited venous ultrasound in one week post ablation of Right GSV. \\nContact info for our Vein Dept. and for Dr. Delano Spear were given to patient if there are any questions or concerns post procedure.
Detail Level: Zone

## 2023-03-16 NOTE — PROCEDURE: ENDOVENOUS ABLATION
Rf Access: Right distal thigh
Tumescent Anesthesia Volume In Cc: [address]
Number Of Catheters Used: 1
Rf Time (Include Units): Length: 15 cm
Rf Catheter Used?: RF Smart CF7-3-60
Medical Necessity Information: **LCD Guidelines vary from payer to payer. \\n**Please check with your payer's policy to determine medical necessity.
Disposition: Compression dressings and stocking(s) were placed post-operatively. Wound care instructions were given, verbal and written.
Medical Necessity Clause: This therapy was medically necessary as the patient has tried and failed conservative therapies including compression, leg elevation and avoiding prolonged standing. Patient also continues to have symptoms of chronic venous insufficiency with symptoms of aching, cramping at night, pain, swelling and tenderness, and is moderate in severity.
Tumescent Anesthesia Administered By (Optional): Dr. Karina Wood
Estimated Blood Loss (Cc): minimal
Detail Level: Detailed
Rf Temperature (Include Units): 120 celsius
Consent was obtained for the above procedure(s). \\nThis therapy was medically necessary because the patient has tried and failed conservative therapies including compression, leg elevation and avoiding prolonged standing. Patient also continues to have symptoms of chronic venous insufficiency with symptoms of aching, cramping at night, pain, swelling and tenderness and is moderate in severity.
Rf Cycles: 6
Hemostasis: Pressure
Rf Sheaths Used: ZAMZAM Introducer Set
Number Of Incisions Per Microphlebectomy: 0

## 2023-03-22 ENCOUNTER — APPOINTMENT (RX ONLY)
Dept: URBAN - METROPOLITAN AREA CLINIC 116 | Facility: CLINIC | Age: 43
Setting detail: DERMATOLOGY
End: 2023-03-22

## 2023-03-22 DIAGNOSIS — I87.2 VENOUS INSUFFICIENCY (CHRONIC) (PERIPHERAL): ICD-10-CM

## 2023-03-22 PROCEDURE — 36465 NJX NONCMPND SCLRSNT 1 VEIN: CPT | Mod: RT

## 2023-03-22 PROCEDURE — ? VEIN TREATMENT PLAN

## 2023-03-22 PROCEDURE — ? ADDITIONAL NOTES

## 2023-03-22 PROCEDURE — ? VARITHENA SCLEROTHERAPY

## 2023-03-22 PROCEDURE — ? LOWER EXTREMITY DOPPLER US

## 2023-03-22 PROCEDURE — ? DIAGNOSIS COMMENT

## 2023-03-22 ASSESSMENT — LOCATION SIMPLE DESCRIPTION DERM: LOCATION SIMPLE: RIGHT PRETIBIAL REGION

## 2023-03-22 ASSESSMENT — LOCATION ZONE DERM: LOCATION ZONE: LEG

## 2023-03-22 ASSESSMENT — LOCATION DETAILED DESCRIPTION DERM: LOCATION DETAILED: RIGHT PROXIMAL PRETIBIAL REGION

## 2023-03-22 NOTE — PROCEDURE: VARITHENA SCLEROTHERAPY
Consent was obtained with risks, benefits, and alternatives discussed for the above procedures.
Number Of Injections (Will Not Render If 0): 0
Expiration Date A (Month Year): 2/2024
Post-Care Instructions: Compression for the next 48 hours is critical to optimize your recovery and results. \\nCompliance with compression helps to prevent blood clots and minimizes pain, swelling, bruising, skin discoloration (staining) and the recurrence of vessels. Materials to gather for your wound care (all available over the counter): Compression stockings x 2 pairs, 4 x 4 gauze, Comprilan wrap: 8 cm and 10 cm width wrap, Medical adhesive tape. \\nCompression and Wound care;  Leg compression for 3 weeks is patel to your success and safety. Compression at night is only needed the first day. After that, compression is needed only during waking hours. However, if your leg feels better with compression at night, then you may continue compression at night as tolerated. After the sclerotherapy procedure, 2 layers compression will be placed. 1. On the skin, folded or flat gauze will cover the treated areas. 2. A compression wrap (Comprilan) will be wrapped around your leg over the gauze. Once the compression wrap is in place, a compression stocking will be worn. This two layer  compression (wrap plus stocking) should be worn for the first 24 hours if tolerated. 3. After 24 hours, remove all compressions and dressings and just wear the compression stockings only during waking hours. You will need to wear compression stockings for three weeks after your procedure, unless your physician instructs you otherwise. Activity: It is important NOT to be sitting or lying down for several hours after surgery. You may begin walking immediately after surgery. This is good for you, but take it easy. For 2 days after treatment: Avoid aerobic exercise, weight training, and all other types of exertion that increase your breathing and pulse rates. Do not get a tan for one month after sclerotherapy. Tanning increases your risk of skin discoloration. Bathing:       After 24 hours, you may shower or bathe in tepid water, but keep the compression stocking on. Avoid immersion in hot tubs. For pain or discomfort: You may take acetaminophen (TylenolTM, Extra-Strength TylenolTM or DatrilTM) as directed. Do not use aspirin, products containing aspirin, or ibuprofen (for example AdvilTM or MotrinTM) for five days after your surgery, unless approved by your physician. Dietary restrictions: Do not drink alcoholic beverages for two days after your sclerotherapy procedure. Possible side effects following sclerotherapy:  After sclerotherapy, mild swelling is expected. The injection sites may also become bruised or gray. You may also experience one or more of the following side effects, which almost always go away within one to four months:       Darkening of the injected veins. Brownish staining of the skin. Small clotted vessels under the surface of the skin that you can feel. Bruising of the injection sites:       What to do about bruising - This will resolve within 2-3 weeks. If you wish the bruising to disappear sooner, then applying Arnicare cream (over the counter, health food stores) will help. What to do if you feel small clotted vessels under the surface of the skin:       Call us for a follow up appointment. These small clots can be drained through a small nick. Draining these small clots will help you heal faster and with less discoloration. Contact your physician if you experience any of the following:       Treated areas become increasingly sore, tender, red or warm. Acetaminophen does not relieve your discomfort. Injection sites turn black or the skin around the site breaks down. Ulceration of the injection sites. You develop blisters from the tape. You develop significant swelling or pain in the leg. Darkening of large areas of the skin or foot.
Detail Level: Detailed
Sclerosant Volume (Cc): 4
Sclerosant (A) %: 1
Sclerosant (A): Varithena Foam
Lot # A: [id number]
Disposition: Compression stockings and short stretch elastic bandages were placed postoperatively.
Render Post Care In Note: No
Show Inventory Tab: Hide

## 2023-03-22 NOTE — PROCEDURE: VEIN TREATMENT PLAN
Add Completed Treatment Information: No
Micah Sessions - Left: 1
Ugs Sessions - Left: 2
Detail Level: Simple
Continue Conservative Therapy Text: The patient has signs and symptoms of chronic venous hypertension affecting daily function with ultrasound of the lower extremities demonstrating venous insufficiency. The patient will continue conservative treatment and has been counseled on avoiding prolonged standing, leg elevation, analgesics, exercise weight management, and daily graduated compression stockings use (20-30mmHg or higher).

## 2023-03-22 NOTE — PROCEDURE: ADDITIONAL NOTES
Additional Notes: Additional 6 cc used to treat R D GSV and refluxing tribs (1% foamed Polidocanol)\\nLot [id number], Exp 9/2024\\n\\nDfranco is s/p RFA ago R GSV 6 days ago. She reports a mild recovery, but still experiences aching, discomfort and tenderness. A limited u/s study was performed to confirm proper closure of R GSV. The scan also confirmed persistent reflux in R Distal GSV and tributaries. Discussed that our treatment today could take up to 7-10 days to see improvement in symptoms, but if they are not alleviated to RTC for UGS Tx #1 on RLE. Current treatment plan also includes RFA to L GSV, Varithena/UGS x 3 if needed, and RFA to R SSV. Patient agrees with this plan.
Detail Level: Simple
Render Risk Assessment In Note?: no

## 2023-03-22 NOTE — PROCEDURE: DIAGNOSIS COMMENT
Comment: Pt has hx migraines, including visual aura.  Explained increased risk of migraine triggered by tx, pt states she is not driving and has Ubrelvy on hand in event of migraine
Render Risk Assessment In Note?: no
Detail Level: Simple

## 2023-03-22 NOTE — PROCEDURE: LOWER EXTREMITY DOPPLER US
See Attached Documentation Text: Please refer to the attached ultrasound documentation for complete details of the procedure and the venous findings.
Continue Post-Procedural Therapy: Yes
Reflux Options: Use Range
Intraluminal Thrombus: No
Left Intraluminal Thrombus- No: The left deep veins were imaged from the level of the common femoral vein to the posterior tibial veins. All deep veins demonstrated compressibility without evidence of intraluminal thrombus.
Detail Level: Simple
Right Intraluminal Thrombus- No: The right deep veins were imaged from the level of the common femoral vein to the posterior tibial veins. All deep veins demonstrated compressibility without evidence of intraluminal thrombus.
Use - 'see Attached Documentation' Verbiage?: Yes - Will Include Text Below and Will Remove Other Portions of the Note
Continue Conservative Therapy Text: Continue conservative treatment (such as compression stockings, OTC analgesics, and exercise) and consider intervention if no change or worsening symptoms to varicosities.
Left Intraluminal Thrombus- Yes: The left deep veins were imaged from the level of the common femoral vein to the posterior tibial veins. There was evidence of intraluminal thrombus as noted above.
Right Intraluminal Thrombus- Yes: The right deep veins were imaged from the level of the common femoral vein to the posterior tibial veins. There was evidence of intraluminal thrombus as noted above.

## 2023-04-13 ENCOUNTER — APPOINTMENT (RX ONLY)
Dept: URBAN - METROPOLITAN AREA CLINIC 121 | Facility: CLINIC | Age: 43
Setting detail: DERMATOLOGY
End: 2023-04-13

## 2023-04-13 DIAGNOSIS — I87.2 VENOUS INSUFFICIENCY (CHRONIC) (PERIPHERAL): ICD-10-CM | Status: WORSENING

## 2023-04-13 PROCEDURE — 36475 ENDOVENOUS RF 1ST VEIN: CPT | Mod: LT

## 2023-04-13 PROCEDURE — ? ENDOVENOUS ABLATION

## 2023-04-13 PROCEDURE — ? RECOMMENDATIONS

## 2023-04-13 ASSESSMENT — LOCATION SIMPLE DESCRIPTION DERM: LOCATION SIMPLE: LEFT PRETIBIAL REGION

## 2023-04-13 ASSESSMENT — LOCATION DETAILED DESCRIPTION DERM: LOCATION DETAILED: LEFT MEDIAL PROXIMAL PRETIBIAL REGION

## 2023-04-13 ASSESSMENT — LOCATION ZONE DERM: LOCATION ZONE: LEG

## 2023-04-13 NOTE — PROCEDURE: ENDOVENOUS ABLATION
Number Of Catheters Used: 1
Rf Temperature (Include Units): 120 celsius
Rf Time (Include Units): Length: 35cm
Consent was obtained for the above procedure(s).
Disposition: Compression dressings and stockings were placed postoperatively. \\nWound care instructions were given, verbal and written.
Medical Necessity Clause: **This therapy was medically necessary as the patient has tried and failed conservative therapies including compression, leg elevation and avoiding prolonged standing. Patient also continues to have symptoms of chronic venous insufficiency with symptoms of aching, fatigue, itching, and swelling and is moderate to severe in severity noting left worse than right.
Tumescent Anesthesia Administered By (Optional): Dr. Abdullahi Angel
Rf Sheaths Used: ZAMZAM Introducer Set
Rf Catheter Used?: RF Smart CF7-7-60
Estimated Blood Loss (Cc): minimal
Number Of Incisions Per Microphlebectomy: 0
Rf Access: Left below knee
Medical Necessity Information: **LCD Guidelines vary from payer to payer. \\n**Please check with your payer's policy to determine medical necessity.
Hemostasis: Pressure
Detail Level: Detailed
Rf Cycles: 6
Tumescent Anesthesia Volume In Cc: [address]
Show Inventory: Show

## 2023-04-13 NOTE — PROCEDURE: RECOMMENDATIONS
Recommendation Preamble: The following recommendations were made during the visit:
Detail Level: Zone
Patient Management Risk Assessment: Moderate
Recommendations (Free Text): Compression dressings and stockings were placed on patient post-operatively. \\nPost care instructions were given to patient verbally and in written form. \\nPatient is already scheduled for limited venous ultrasound in one week post ablation of Left GSV and to perform RFA of RT SSV. \\nContact info for our Vein Dept. and for Dr. Yassine Sorto were given to patient if there are any questions or concerns post procedure. \\n\\n\\nPOST CARE INSTRUCTIONS:\\n*General: Post-operative instructions given and reviewed with patient verbally and in writing. \\n\\n*Activity: You MUST walk at least 10 minutes every hour you are awake for the first 48 hours. You do not have to wake up at night to walk. You MUST keep compression stockings on for 48 hours straight. Steri-strips can be removed 5 days post ablation. Avoid strenuous exercises and weight lifting over 30lbs for 3-5 days. Avoid flying or any long car trips for 2 weeks post procedure. \\n\\n*Compression and Wound Care: You may remove the compression stocking the morning of the second day after surgery. That is when you can shower as normal. If you are not having pain, you do not have to continue wearing the compression stockings. If you are having discomfort, the stockings may help and patient should continue to wear during the day for another 1-2 weeks, You may perform side lunges for thigh and calf stretching post ablation and Massaging the treated area, applying warm compress can also help with any discomfort. \\n\\n*Return Appointment: After the procedure, a return visit in 7 days is essential. An ultrasound examination will be done to confirm the successful treatment of your vein and to evaluate for any complications. \\n\\n*Bathing and Showering: After 48 hours, you may shower as normal, but still must avoid submersing in hot tubs, jacuzzi's or steam rooms for 1 week. Clean your surgery sites during showering and when you are finished bathing, pat your leg dry with a towel and repeat wound care instructions. For two weeks after treatment avoid submersing in hot tubs or hot baths. \\n\\n*Discomfort: Any pain or discomfort should decrease with each day after surgery. You may take over the counter medicines such as Aleve (500mg every 12 hours) or Tylenol (500mg every 6 hours) with food to alleviate any soreness, tightness and numbness which is normal after procedure and should dissipate over the next 1-2 weeks. If your pain is not better, please contact Dr. Yassine Sorto on his cell phone or our Vein Dept. at [phone].
Render Risk Assessment In Note?: yes
supervision

## 2023-04-20 ENCOUNTER — APPOINTMENT (RX ONLY)
Dept: URBAN - METROPOLITAN AREA CLINIC 121 | Facility: CLINIC | Age: 43
Setting detail: DERMATOLOGY
End: 2023-04-20

## 2023-04-20 DIAGNOSIS — I87.2 VENOUS INSUFFICIENCY (CHRONIC) (PERIPHERAL): ICD-10-CM | Status: UNCHANGED

## 2023-04-20 DIAGNOSIS — I83.89 VARICOSE VEINS OF LOWER EXTREMITIES WITH OTHER COMPLICATIONS: ICD-10-CM

## 2023-04-20 PROBLEM — I83.899 VARICOSE VEINS OF UNSPECIFIED LOWER EXTREMITY WITH OTHER COMPLICATIONS: Status: ACTIVE | Noted: 2023-04-20

## 2023-04-20 PROCEDURE — ? RECOMMENDATIONS

## 2023-04-20 PROCEDURE — ? ENDOVENOUS ABLATION

## 2023-04-20 PROCEDURE — 93971 EXTREMITY STUDY: CPT | Mod: 59,LT

## 2023-04-20 PROCEDURE — ? LOWER EXTREMITY DOPPLER US

## 2023-04-20 PROCEDURE — ? ADDITIONAL NOTES

## 2023-04-20 PROCEDURE — 36475 ENDOVENOUS RF 1ST VEIN: CPT | Mod: RT

## 2023-04-20 ASSESSMENT — LOCATION ZONE DERM: LOCATION ZONE: LEG

## 2023-04-20 ASSESSMENT — LOCATION DETAILED DESCRIPTION DERM: LOCATION DETAILED: RIGHT PROXIMAL CALF

## 2023-04-20 ASSESSMENT — LOCATION SIMPLE DESCRIPTION DERM: LOCATION SIMPLE: RIGHT CALF

## 2023-04-20 NOTE — PROCEDURE: LOWER EXTREMITY DOPPLER US
Add Milliseconds Of Reflux To Note?: Yes
Left Ssv Fragmentation And Discontinuity: No
Left Intraluminal Thrombus- Yes: The left deep veins were imaged from the level of the common femoral vein to the posterior tibial veins. There was evidence of intraluminal thrombus as noted above.
Continue Conservative Therapy Text: Continue conservative treatment (such as compression stockings, OTC analgesics, and exercise) and consider intervention if no change or worsening symptoms to varicosities.
Size Options: Use Range
Use - 'see Attached Documentation' Verbiage?: Yes - Will Include Text Below and Will Remove Other Portions of the Note
See Attached Documentation Text: Please refer to the attached ultrasound documentation for complete details of the procedure and the venous findings.
Left Intraluminal Thrombus- No: The left deep veins were imaged from the level of the common femoral vein to the posterior tibial veins. All deep veins demonstrated compressibility without evidence of intraluminal thrombus.
Right Intraluminal Thrombus- No: The right deep veins were imaged from the level of the common femoral vein to the posterior tibial veins. All deep veins demonstrated compressibility without evidence of intraluminal thrombus.
Right Intraluminal Thrombus- Yes: The right deep veins were imaged from the level of the common femoral vein to the posterior tibial veins. There was evidence of intraluminal thrombus as noted above.
Detail Level: Simple

## 2023-04-20 NOTE — PROCEDURE: RECOMMENDATIONS
Recommendation Preamble: The following recommendations were made during the visit:
Recommendations (Free Text): Patient advised of post procedure restrictions and limitations, and provided with written instructions. \\nContact info for our Vein Dept. and for Dr. Ara Cabral were given to patient if there are any questions or concerns post procedure. Will perform a limited ultrasound follow up in 1 week.
Detail Level: Zone
Render Risk Assessment In Note?: no

## 2023-04-20 NOTE — PROCEDURE: ADDITIONAL NOTES
Additional Notes: A limited ultrasound study was performed today s/p RFA of L GSV. Confirmed proper closure of L GSV prox/mid, no signs of DVT. Persistent reflux noted in L D GSV and associated tributaries, measurements taken today. Patient still reports aching/tenderness/cramps so will plan to treat with Varithena.
Detail Level: Simple
Render Risk Assessment In Note?: no

## 2023-04-20 NOTE — PROCEDURE: ENDOVENOUS ABLATION
Consent was obtained for the above procedure(s).
Rf Cycles: 4
Rf Access: right calf
Number Of Sheaths Used: 1
Medical Necessity Information: **LCD Guidelines vary from payer to payer. \\n**Please check with your payer's policy to determine medical necessity.
Estimated Blood Loss (Cc): minimal
Rf Sheaths Used: ZAMZAM Introducer Set
Tumescent Anesthesia Volume In Cc: [address] [facility]
Rf Time (Include Units): Length: 9cm
Number Of Incisions Per Microphlebectomy: 0
Rf Temperature (Include Units): 120 celsius
Detail Level: Detailed
Disposition: Compression dressings and stockings were placed postoperatively. Wound care instructions were given, verbally and in written form.
Tumescent Anesthesia Administered By (Optional): Dr. Suzie Worley
Hemostasis: Pressure
Rf Catheter Used?: RF Smart CF7-3-60
Medical Necessity Clause: This therapy was medically necessary as the patient has tried and failed conservative therapies including compression, leg elevation and avoiding prolonged standing. Patient also continues to have symptoms of chronic venous insufficiency with symptoms of aching, cramping at night, pain, swelling and tenderness, and is moderate in severity.

## 2023-04-27 ENCOUNTER — APPOINTMENT (RX ONLY)
Dept: URBAN - METROPOLITAN AREA CLINIC 121 | Facility: CLINIC | Age: 43
Setting detail: DERMATOLOGY
End: 2023-04-27

## 2023-04-27 DIAGNOSIS — I87.2 VENOUS INSUFFICIENCY (CHRONIC) (PERIPHERAL): ICD-10-CM

## 2023-04-27 PROCEDURE — 36465 NJX NONCMPND SCLRSNT 1 VEIN: CPT | Mod: LT

## 2023-04-27 PROCEDURE — ? PHOTO-DOCUMENTATION

## 2023-04-27 PROCEDURE — ? LOWER EXTREMITY DOPPLER US

## 2023-04-27 PROCEDURE — ? RECOMMENDATIONS

## 2023-04-27 PROCEDURE — 93971 EXTREMITY STUDY: CPT | Mod: 59,RT

## 2023-04-27 PROCEDURE — ? VARITHENA SCLEROTHERAPY

## 2023-04-27 PROCEDURE — ? MEDICAL CONSULTATION: VENOUS DISEASE

## 2023-04-27 PROCEDURE — 99214 OFFICE O/P EST MOD 30 MIN: CPT | Mod: 25,RT

## 2023-04-27 ASSESSMENT — LOCATION SIMPLE DESCRIPTION DERM
LOCATION SIMPLE: LEFT PRETIBIAL REGION
LOCATION SIMPLE: RIGHT CALF
LOCATION SIMPLE: RIGHT ANKLE
LOCATION SIMPLE: RIGHT PRETIBIAL REGION
LOCATION SIMPLE: RIGHT POPLITEAL SKIN

## 2023-04-27 ASSESSMENT — LOCATION DETAILED DESCRIPTION DERM
LOCATION DETAILED: RIGHT POPLITEAL SKIN
LOCATION DETAILED: RIGHT DISTAL CALF
LOCATION DETAILED: LEFT DISTAL PRETIBIAL REGION
LOCATION DETAILED: RIGHT POSTERIOR ANKLE
LOCATION DETAILED: RIGHT MEDIAL DISTAL PRETIBIAL REGION
LOCATION DETAILED: RIGHT PROXIMAL CALF

## 2023-04-27 ASSESSMENT — LOCATION ZONE DERM: LOCATION ZONE: LEG

## 2023-04-27 NOTE — PROCEDURE: MEDICAL CONSULTATION: VENOUS DISEASE
Left Leg Ulcer Diameter: 0- None
Left Leg Venous Hyperpigmentation: 0- None or focal low intensity (tan)
Left Leg Circumference: medium
Right Leg: Peripheral Vascular Disease?: No
Include Left Vcss In Note: Yes
Follow Up Instructions:: Patient presented today for their follow up and limited ultrasound s/p Right SSV RFA. \\nPatient was advised that they may continue to wear compression stockings as needed for any discomfort while recovering from radiofrequency ablation may wear up to 48 hours before their next scheduled procedure and is highly recommended for long car trips greater than 2 hours and when flying. \\nPreventative strategies were reviewed including weight loss through diet and exercise and toning leg muscles. A venous fact sheet was given, which reviews venous anatomy/pathophysiology of venous disease and potential treatment options were discussed in detail, with risks, benefits and alternatives. \\nThe patient's questions were answered in full.
Left Dorsalis Pedis Pulse: 2 (Easily palpable)
Detail Level: Detailed
Other Plan: Limited US post RF of Rt SSV today
Right Leg Compression Therapy: 0- None or noncompliant

## 2023-04-27 NOTE — PROCEDURE: RECOMMENDATIONS
Recommendation Preamble: The following recommendations were made during the visit:
Render Risk Assessment In Note?: yes
Detail Level: Zone
Recommendations (Free Text): Patient was treated with Demetrice Cardona on the Left D GSV today. \\nPost care instructions were reviewed with patient verbally and given in written form. \\nCompleted s/p US of RT SSV confirming no post procedure complications. \\nAdvised home coagulant management for a small area of the LT GSV post RFA in the medial thigh. \\nTreatment plan was reviewed with patient and determined we will proceed with scheduling for remaining treatment plan that includes Varithena and UGS.
Patient Management Risk Assessment: Moderate

## 2023-04-27 NOTE — PROCEDURE: VARITHENA SCLEROTHERAPY
Volume Of Varithena Foam Removed From Canister (Cc): 4
Post-Care Instructions: Compression for 3 weeks is critical to optimize your recovery and results. Compliance with compression helps to prevent blood clots and minimizes pain, swelling, bruising, skin discoloration (staining) and the recurrence of vessels. Materials to gather for your wound care (all available over the counter): Compression stockings x 2 pairs, 4 x 4 gauze, Comprilan wrap: 8 cm and 10 cm width wrap, Medical adhesive tape. Compression and Wound care;  Leg compression for 3 weeks is patel to your success and safety. Compression at night is only needed the first day. After that, compression is needed only during waking hours. However, if your leg feels better with compression at night, then you may continue compression at night as tolerated. After the sclerotherapy procedure, 2 layers compression will be placed. 1. On the skin, folded or flat gauze will cover the treated areas. 2. A compression wrap (Comprilan) will be wrapped around your leg over the gauze. Once the compression wrap is in place, a compression stocking will be worn. This two layer  compression (wrap plus stocking) should be worn for the first 24 hours if tolerated. 3. After 24 hours, remove all compressions and dressings and just wear the compression stockings only during waking hours. You will need to wear compression stockings for three weeks after your procedure, unless your physician instructs you otherwise. Activity: It is important NOT to be sitting or lying down for several hours after surgery. You may begin walking immediately after surgery. This is good for you, but take it easy. For 2 days after treatment: Avoid aerobic exercise, weight training, and all other types of exertion that increase your breathing and pulse rates. Do not get a tan for one month after sclerotherapy. Tanning increases your risk of skin discoloration. Bathing:       After 24 hours, you may shower or bathe in tepid water, but keep the compression stocking on. Avoid immersion in hot tubs. For pain or discomfort: You may take acetaminophen (TylenolTM, Extra-Strength TylenolTM or DatrilTM) as directed. Do not use aspirin, products containing aspirin, or ibuprofen (for example AdvilTM or MotrinTM) for five days after your surgery, unless approved by your physician. Dietary restrictions: Do not drink alcoholic beverages for two days after your sclerotherapy procedure. Possible side effects following sclerotherapy:  After sclerotherapy, mild swelling is expected. The injection sites may also become bruised or gray. You may also experience one or more of the following side effects, which almost always go away within one to four months:       Darkening of the injected veins. Brownish staining of the skin. Small clotted vessels under the surface of the skin that you can feel. Bruising of the injection sites:       What to do about bruising - This will resolve within 2-3 weeks. If you wish the bruising to disappear sooner, then applying Arnicare cream (over the counter, health food stores) will help. What to do if you feel small clotted vessels under the surface of the skin:       Call us for a follow up appointment. These small clots can be drained through a small nick. Draining these small clots will help you heal faster and with less discoloration. Contact your physician if you experience any of the following:       Treated areas become increasingly sore, tender, red or warm. Acetaminophen does not relieve your discomfort. Injection sites turn black or the skin around the site breaks down. Ulceration of the injection sites. You develop blisters from the tape. You develop significant swelling or pain in the leg. Darkening of large areas of the skin or foot.
Disposition: Compression stockings and short stretch elastic bandages were placed postoperatively.
Lot # A: [id number]
Expiration Date A (Month Year): 03/2024
Detail Level: Detailed
Sclerosant (A) %: 1
Sclerosant (A): Varithena Foam
Render Post Care In Note: No
Consent was obtained with risks, benefits, and alternatives discussed for the above procedures.
Number Of Injections (Will Not Render If 0): 0
Sclerosant Volume (Cc): 3
Show Inventory Tab: Hide

## 2023-05-11 ENCOUNTER — APPOINTMENT (RX ONLY)
Dept: URBAN - METROPOLITAN AREA CLINIC 121 | Facility: CLINIC | Age: 43
Setting detail: DERMATOLOGY
End: 2023-05-11

## 2023-05-11 DIAGNOSIS — I87.2 VENOUS INSUFFICIENCY (CHRONIC) (PERIPHERAL): ICD-10-CM

## 2023-05-11 PROCEDURE — ? VARITHENA SCLEROTHERAPY

## 2023-05-11 PROCEDURE — 36465 NJX NONCMPND SCLRSNT 1 VEIN: CPT | Mod: RT

## 2023-05-11 PROCEDURE — ? RECOMMENDATIONS

## 2023-05-11 ASSESSMENT — LOCATION DETAILED DESCRIPTION DERM: LOCATION DETAILED: RIGHT POSTERIOR ANKLE

## 2023-05-11 ASSESSMENT — LOCATION ZONE DERM: LOCATION ZONE: LEG

## 2023-05-11 ASSESSMENT — LOCATION SIMPLE DESCRIPTION DERM: LOCATION SIMPLE: RIGHT ANKLE

## 2023-05-11 NOTE — PROCEDURE: RECOMMENDATIONS
Detail Level: Zone
Render Risk Assessment In Note?: yes
Recommendations (Free Text): Patient was treated with Varithena on RT Distal SSV today. \\nPost care instructions were reviewed with patient verbally and given in written form. \\nPatients treatment plan was reviewed and we will proceed with scheduling for UGS LT D GSV TRIBS. \\nRecommended use of dermaka cream 2-3 times daily for the next 2-3 weeks to prevent staining (patient declined to purchase today, stating she knows someone who has some and will borrow)
Patient Management Risk Assessment: Moderate
Recommendation Preamble: The following recommendations were made during the visit:

## 2023-05-11 NOTE — PROCEDURE: VARITHENA SCLEROTHERAPY
Number Of Injections (Will Not Render If 0): 0
Post-Care Instructions: Compression for the next 48 hours is critical to optimize your recovery and results. \\nCompliance with compression helps to prevent blood clots and minimizes pain, swelling, bruising, skin discoloration (staining) and the recurrence of vessels. Materials to gather for your wound care (all available over the counter): Compression stockings x 2 pairs, 4 x 4 gauze, Comprilan wrap: 8 cm and 10 cm width wrap, Medical adhesive tape. \\nCompression and Wound care;  Leg compression for 3 weeks is patel to your success and safety. Compression at night is only needed the first day. After that, compression is needed only during waking hours. However, if your leg feels better with compression at night, then you may continue compression at night as tolerated. After the sclerotherapy procedure, 2 layers compression will be placed. 1. On the skin, folded or flat gauze will cover the treated areas. 2. A compression wrap (Comprilan) will be wrapped around your leg over the gauze. Once the compression wrap is in place, a compression stocking will be worn. This two layer  compression (wrap plus stocking) should be worn for the first 24 hours if tolerated. 3. After 24 hours, remove all compressions and dressings and just wear the compression stockings only during waking hours. You will need to wear compression stockings for three weeks after your procedure, unless your physician instructs you otherwise. Activity: It is important NOT to be sitting or lying down for several hours after surgery. You may begin walking immediately after surgery. This is good for you, but take it easy. For 2 days after treatment: Avoid aerobic exercise, weight training, and all other types of exertion that increase your breathing and pulse rates. Do not get a tan for one month after sclerotherapy. Tanning increases your risk of skin discoloration. Bathing:       After 24 hours, you may shower or bathe in tepid water, but keep the compression stocking on. Avoid immersion in hot tubs. For pain or discomfort: You may take acetaminophen (TylenolTM, Extra-Strength TylenolTM or DatrilTM) as directed. Do not use aspirin, products containing aspirin, or ibuprofen (for example AdvilTM or MotrinTM) for five days after your surgery, unless approved by your physician. Dietary restrictions: Do not drink alcoholic beverages for two days after your sclerotherapy procedure. Possible side effects following sclerotherapy:  After sclerotherapy, mild swelling is expected. The injection sites may also become bruised or gray. You may also experience one or more of the following side effects, which almost always go away within one to four months:       Darkening of the injected veins. Brownish staining of the skin. Small clotted vessels under the surface of the skin that you can feel. Bruising of the injection sites:       What to do about bruising - This will resolve within 2-3 weeks. If you wish the bruising to disappear sooner, then applying Arnicare cream (over the counter, health food stores) will help. What to do if you feel small clotted vessels under the surface of the skin:       Call us for a follow up appointment. These small clots can be drained through a small nick. Draining these small clots will help you heal faster and with less discoloration. Contact your physician if you experience any of the following:       Treated areas become increasingly sore, tender, red or warm. Acetaminophen does not relieve your discomfort. Injection sites turn black or the skin around the site breaks down. Ulceration of the injection sites. You develop blisters from the tape. You develop significant swelling or pain in the leg. Darkening of large areas of the skin or foot.
Sclerosant Volume (Cc): 2
Sclerosant (A) %: 1
Sclerosant (A): Varithena Foam
Lot # A: [id number]
Disposition: Compression stockings and short stretch elastic bandages were placed postoperatively.
Detail Level: Detailed
Consent was obtained with risks, benefits, and alternatives discussed for the above procedures.
Render Post Care In Note: No
Expiration Date A (Month Year): 03/2024
Show Inventory Tab: Hide

## 2023-05-18 ENCOUNTER — APPOINTMENT (RX ONLY)
Dept: URBAN - METROPOLITAN AREA CLINIC 121 | Facility: CLINIC | Age: 43
Setting detail: DERMATOLOGY
End: 2023-05-18

## 2023-05-18 DIAGNOSIS — I87.2 VENOUS INSUFFICIENCY (CHRONIC) (PERIPHERAL): ICD-10-CM

## 2023-05-18 PROCEDURE — ? SCLEROTHERAPY

## 2023-05-18 PROCEDURE — ? PHOTO-DOCUMENTATION

## 2023-05-18 PROCEDURE — 36471 NJX SCLRSNT MLT INCMPTNT VN: CPT | Mod: LT

## 2023-05-18 PROCEDURE — 76942 ECHO GUIDE FOR BIOPSY: CPT

## 2023-05-18 PROCEDURE — ? RECOMMENDATIONS

## 2023-05-18 ASSESSMENT — LOCATION DETAILED DESCRIPTION DERM
LOCATION DETAILED: LEFT MEDIAL DISTAL PRETIBIAL REGION
LOCATION DETAILED: LEFT MEDIAL PROXIMAL PRETIBIAL REGION

## 2023-05-18 ASSESSMENT — LOCATION ZONE DERM: LOCATION ZONE: LEG

## 2023-05-18 ASSESSMENT — LOCATION SIMPLE DESCRIPTION DERM: LOCATION SIMPLE: LEFT PRETIBIAL REGION

## 2023-05-18 NOTE — PROCEDURE: SCLEROTHERAPY
Detail Level: Simple
Sclerosant Volume (Cc): 3
Number Of Injections (Will Not Render If 0): 0
Render Post Care In Note: Yes
Consent was obtained with risks, benefits, and alternatives discussed for the above procedures.
Expiration Date A (Month Year): 06/09/2023
Post-Care Instructions: POST SCLEROTHERAPY INSTRUCTIONS:\\n*Wear compression stockings for (2) days including sleeping in them for the first 48 hours,\\n*Walk often, even right after the procedure. A minimum of 10 minutes per waking hour for the first 48 hours. \\n*No strenuous exercise or lifting greater than 30lbs. for the first 48 hours. (i.e. running, biking, yoga, workout classes, elliptical machines, treadmills)\\n*Avoid sun exposure for 2 weeks due to risk of discoloration/hyperpigmentation at injection sites and along treated veins. \\n*Avoid sauna's, steam rooms, hot tubs, jacuzzi, swimming pools and bath tubs for 1 week. \\n*Shower as normal after removing compression stockings after the first 48 hours. \\n \\nCOAGULUM MANAGEMENT:\\n-Sometimes small pockets of blood can get trapped when a vein starts to close down after sclerotherapy. This is called coagulum. If this occurs you may notice lumps, tenderness or dark spots along the veins that were injected. \\n-What to do if this occurs:\\n*Apply warm compress to the area 2-3 times a day for 10 minutes at a time then gently massage the area for an additional 1-2 minutes. Continue until lumps and tenderness are gone. \\n*If needed you may take over the counter Aleve (per label directions), Ibuprofen 400-600mg every 8 hours (with food), or Tylenol (per label directions) as long as there are no other personal health history medical contraindications to do so. \\n*The [facility] provider will address any lumps and tenderness that are present in your next scheduled appointment. If for some reason you feel the compress/massage is not managing or you are extremely uncomfortable, call the office at [phone]. \\n\\nDERMAKA CREAM:\\n*The use of all natural Dermaka cream is recommended for all vein center patients to aid in the healing process. \\n*Dermaka should be applied to treated areas 2-3 times daily for 2 weeks post treatment. Noting the cream is thick and a little goes a long way. \\n*Dermaka can be purchased at [employer]. \\n\\nIf you have any questions regarding these instructions, please feel free to call the office.
Sclerosant Volume (Cc): 10
Sclerosant (B) %: 0.30
Sclerosant (A) %: 0.50
Disposition: Short stretch elastic bandages and compression stockings were placed post-operatively.
Sclerosant Volume (Cc): 8
Lot # B: [id number]
Lot # A: [id number]
Ultrasound Or Visual Sclerotherapy: Ultrasound

## 2023-05-18 NOTE — PROCEDURE: PHOTO-DOCUMENTATION
Photo Preface (Leave Blank If You Do Not Want): Ultrasound Photographs were obtained today
Detail Level: Zone

## 2023-05-18 NOTE — PROCEDURE: RECOMMENDATIONS
Render Risk Assessment In Note?: yes
Patient Management Risk Assessment: Moderate
Recommendations (Free Text): Post care instructions were reviewed verbally and written form was declined as patient still has a previous copy. \\nPatient will schedule for 1-2 weeks for UGS to RT D SSV Tribs\\nPatient was reminded to bring in compression to next visit.
Recommendation Preamble: The following recommendations were made during the visit:
Detail Level: Zone

## 2023-05-25 ENCOUNTER — APPOINTMENT (RX ONLY)
Dept: URBAN - METROPOLITAN AREA CLINIC 121 | Facility: CLINIC | Age: 43
Setting detail: DERMATOLOGY
End: 2023-05-25

## 2023-05-25 DIAGNOSIS — I83.89 VARICOSE VEINS OF LOWER EXTREMITIES WITH OTHER COMPLICATIONS: ICD-10-CM

## 2023-05-25 PROBLEM — I83.899 VARICOSE VEINS OF UNSPECIFIED LOWER EXTREMITY WITH OTHER COMPLICATIONS: Status: ACTIVE | Noted: 2023-05-25

## 2023-05-25 PROCEDURE — 99214 OFFICE O/P EST MOD 30 MIN: CPT

## 2023-05-25 PROCEDURE — ? MEDICAL CONSULTATION: VENOUS DISEASE

## 2023-05-25 PROCEDURE — ? ADDITIONAL NOTES

## 2023-05-25 PROCEDURE — ? DOPPLER US

## 2023-05-25 PROCEDURE — 93971 EXTREMITY STUDY: CPT

## 2023-05-25 PROCEDURE — ? RECOMMENDATIONS

## 2023-05-25 NOTE — PROCEDURE: MEDICAL CONSULTATION: VENOUS DISEASE
Left Leg Varicose Veins: 0- None
Include Vcss In The Note?: No
Left Leg Venous Hyperpigmentation: 0- None or focal low intensity (tan)
Include Left Vcss In Note: Yes
Right Leg Compression Therapy: 0- None or noncompliant
Detail Level: Zone
Right Dorsalis Pedis Pulse: 2 (Easily palpable)
Right Leg Circumference: medium
Other Plan: limited u/s left lower extremity

## 2023-05-25 NOTE — PROCEDURE: ADDITIONAL NOTES
Detail Level: Simple
Additional Notes: U/S showed left CFV and Popliteal compressible and without obstruction.
Render Risk Assessment In Note?: no

## 2023-05-25 NOTE — PROCEDURE: DOPPLER US
Left Ssv/Lsv Reflux (Sec): no reflux
Detail Level: Simple
Use - 'see Attached Documentation' Verbiage?: Yes - Will Include Text Below and Will Remove Other Portions of the Note
Use Ssv Or Lsv: LSV
See Attached Documentation Text: Please refer to the attached ultrasound documentation for complete details of the procedure and the venous findings.

## 2023-06-15 ENCOUNTER — APPOINTMENT (RX ONLY)
Dept: URBAN - METROPOLITAN AREA CLINIC 121 | Facility: CLINIC | Age: 43
Setting detail: DERMATOLOGY
End: 2023-06-15

## 2023-06-15 DIAGNOSIS — I87.2 VENOUS INSUFFICIENCY (CHRONIC) (PERIPHERAL): ICD-10-CM

## 2023-06-15 PROCEDURE — 99214 OFFICE O/P EST MOD 30 MIN: CPT | Mod: NC

## 2023-06-15 PROCEDURE — 93970 EXTREMITY STUDY: CPT

## 2023-06-15 PROCEDURE — ? MEDICAL CONSULTATION: VENOUS DISEASE

## 2023-06-15 PROCEDURE — ? RECOMMENDATIONS

## 2023-06-15 PROCEDURE — ? DOPPLER US

## 2023-06-15 NOTE — PROCEDURE: RECOMMENDATIONS
Recommendation Preamble: The following recommendations were made during the visit:
Render Risk Assessment In Note?: yes
Recommendations (Free Text): Performed 3mo post RFA on Bilateral GSV and Right SSV, ultrasound visualization shows treated veins closed and the CFV, MFV and POP veins are compressible and without obstruction. \\nDues to refluxing tributaries we will see patient back for UGS RT GSV Tribs followed by UGS LT GSV Tribs. \\nWe also recommend that the patient return to the clinic for a 6 month and 1year follow-up or sooner if any new signs or worsening symptoms arise such as pain in the legs, swelling or skin discoloration.
Detail Level: Zone
Patient Management Risk Assessment: Moderate

## 2023-06-15 NOTE — PROCEDURE: MEDICAL CONSULTATION: VENOUS DISEASE
Left Leg Induration: 0- None
Left Leg Venous Hyperpigmentation: 0- None or focal low intensity (tan)
Left Leg: Peripheral Vascular Disease?: No
Right Leg Circumference: medium
Include Left Vcss In Note: Yes
Other Plan: Preventative and Conservative Management
Right Leg Compression Therapy: 0- None or noncompliant
Left Dorsalis Pedis Pulse: 2 (Easily palpable)
Detail Level: Simple

## 2023-06-15 NOTE — PROCEDURE: DOPPLER US
Use Ssv Or Lsv: LSV
Left Mid Gsv Reflux (Sec): no reflux
Use - 'see Attached Documentation' Verbiage?: Yes - Will Include Text Below and Will Remove Other Portions of the Note
Detail Level: Simple
See Attached Documentation Text: Please refer to the attached ultrasound documentation for complete details of the procedure and the venous findings. \\n\\n\"Photographs obtained showing the Left CFV, Mid FV and Popliteal compressible and without obstruction\". \\n\"Treated Veins Closed\"\\n\\nPhotographs obtained showing the Right CFV, Mid FV and Popliteal compressible and without obstruction\". \\n\"Treated Veins Closed\"

## 2023-06-29 ENCOUNTER — APPOINTMENT (RX ONLY)
Dept: URBAN - METROPOLITAN AREA CLINIC 121 | Facility: CLINIC | Age: 43
Setting detail: DERMATOLOGY
End: 2023-06-29

## 2023-06-29 DIAGNOSIS — I83.81 VARICOSE VEINS OF LOWER EXTREMITIES WITH PAIN: ICD-10-CM | Status: WORSENING

## 2023-06-29 PROBLEM — I83.812 VARICOSE VEINS OF LEFT LOWER EXTREMITY WITH PAIN: Status: ACTIVE | Noted: 2023-06-29

## 2023-06-29 PROCEDURE — 10160 PNXR ASPIR ABSC HMTMA BULLA: CPT

## 2023-06-29 PROCEDURE — ? RECOMMENDATIONS

## 2023-06-29 PROCEDURE — ? MEDICAL CONSULTATION: VENOUS DISEASE

## 2023-06-29 PROCEDURE — ? ADDITIONAL NOTES

## 2023-06-29 PROCEDURE — ? ULTRASONIC GUIDANCE FOR NEEDLE PLACEMENT

## 2023-06-29 PROCEDURE — 76942 ECHO GUIDE FOR BIOPSY: CPT

## 2023-06-29 PROCEDURE — ? PUNCTURE ASPIRATION

## 2023-06-29 ASSESSMENT — LOCATION ZONE DERM: LOCATION ZONE: LEG

## 2023-06-29 ASSESSMENT — LOCATION DETAILED DESCRIPTION DERM: LOCATION DETAILED: LEFT PROXIMAL PRETIBIAL REGION

## 2023-06-29 ASSESSMENT — LOCATION SIMPLE DESCRIPTION DERM: LOCATION SIMPLE: LEFT PRETIBIAL REGION

## 2023-06-29 NOTE — PROCEDURE: ULTRASONIC GUIDANCE FOR NEEDLE PLACEMENT
Procedure Performed: Aspiration
Add Additional Impression: No
Impression 1: Ultrasound guidance was used to locate areas of trapped blood and in sterile fashion areas were cleansed and prepped. Using an 18g needle a small nick was made to evacuate, aspirate and manually manipulate any remaining trapped blood. Patient tolerated procedure well with no immediate complaints. Post procedure bandages were applied and patient left in stable condition.
Needle Biopsy Technique Text: Ultrasonic Guidance For Needle Placement During Biopsy
Indication: Intravascular Coagulum Pain
Injection Technique Text: Ultrasonic Guidance For Needle Placement During Injection
Localization Of Device Technique Text: Ultrasonic Guidance For Needle Placement During Localization of Device
Findings: Visualization with ultrasound revealed small amounts of trapped blood in the treated/closed vein.
Aspiration Technique Text: Ultrasonic Guidance For Needle Placement During Aspiration
Detail Level: Simple
None

## 2023-06-29 NOTE — PROCEDURE: MEDICAL CONSULTATION: VENOUS DISEASE
Left Leg Pain: 0- None
Left Dorsalis Pedis Pulse: 2 (Easily palpable)
Left Leg Compression Therapy: 0- None or noncompliant
Right Leg Venous Hyperpigmentation: 0- None or focal low intensity (tan)
Right Leg: Peripheral Vascular Disease?: No
Right Leg Circumference: medium
Detail Level: Simple
Include Right Vcss In Note: Yes

## 2023-06-29 NOTE — PROCEDURE: ADDITIONAL NOTES
Additional Notes: Visual Sclerotherapy performed 3ccâs of 0.3%.  \\nLot: [id number]\\nExp:07/27/2023
Detail Level: Simple
Render Risk Assessment In Note?: no

## 2023-06-29 NOTE — PROCEDURE: RECOMMENDATIONS
Detail Level: Zone
Recommendation Preamble: The following recommendations were made during the visit:
Recommendations (Free Text): Patient advised that they may continue to wear the compression stockings as needed during waking hours for any discomfort while recovering from recent procedure(s). They may wear compression up to 48 hours before their next scheduled procedure and is highly recommended for long car trips greater than 2 hours and when flying. \\nPatient  advised to perform home coagulum management daily along with side lunges and calf stretching to loosen up the tight sore areas. .\\nContact Office if Varicosities become painful, thrombosed, or red.
Patient Management Risk Assessment: Low
Render Risk Assessment In Note?: yes

## 2023-06-29 NOTE — HPI: VEIN EVALUATION
Which Leg Is Worse?: Left
Is This A New Presentation, Or A Follow-Up?: Vein Evaluation
Additional History: Patient was originally scheduled for UGS of her right d GSV tribs but is complaining of discomfort and aching on her left medial lower leg. Patient has concerns and would like left leg evaluated.

## 2023-06-29 NOTE — PROCEDURE: PUNCTURE ASPIRATION
Post-Care Instructions: I reviewed with the patient in detail post-care instructions. Patient should keep wound covered and call the office should any redness, pain, swelling or worsening occur.
Anesthesia Type: 1% lidocaine without epinephrine
Drainage Amount In Cc (Will Not Render If Left At 0): 0.1
Render Postcare In Note?: Yes
Preparation Text: The area was prepped in the usual clean fashion.
Consent was obtained and risks were reviewed including but not limited to delayed wound healing, infection, need for multiple procedures to completely drain the lesion, and pain.
Anesthesia Volume In Cc: 1
Drainage Type?: bloody
Detail Level: Simple
Curette Text (Optional): After the contents were expressed a curette was used to partially remove the cyst wall.
Size Of Lesion In Cm (Optional But May Be Required For Some Insurances): 0
Puncture Method: 18G needle
Dressing: pressure dressing

## 2023-07-31 ENCOUNTER — APPOINTMENT (RX ONLY)
Dept: URBAN - METROPOLITAN AREA CLINIC 116 | Facility: CLINIC | Age: 43
Setting detail: DERMATOLOGY
End: 2023-07-31

## 2023-07-31 DIAGNOSIS — D22 MELANOCYTIC NEVI: ICD-10-CM

## 2023-07-31 PROBLEM — D22.4 MELANOCYTIC NEVI OF SCALP AND NECK: Status: ACTIVE | Noted: 2023-07-31

## 2023-07-31 PROCEDURE — 99212 OFFICE O/P EST SF 10 MIN: CPT

## 2023-07-31 PROCEDURE — ? DEFER

## 2023-07-31 PROCEDURE — ? OBSERVATION

## 2023-07-31 PROCEDURE — ? COUNSELING

## 2023-07-31 ASSESSMENT — LOCATION DETAILED DESCRIPTION DERM: LOCATION DETAILED: LEFT SUPERIOR POSTAURICULAR SKIN

## 2023-07-31 ASSESSMENT — LOCATION SIMPLE DESCRIPTION DERM: LOCATION SIMPLE: SCALP

## 2023-07-31 ASSESSMENT — LOCATION ZONE DERM: LOCATION ZONE: SCALP

## 2023-07-31 NOTE — PROCEDURE: DEFER
Detail Level: Zone
Size Of Lesion In Cm (Optional): 0
Procedure To Be Performed At Next Visit: Shave Removal
Introduction Text (Please End With A Colon): The following procedure was deferred:
Scheduling Instructions (Optional): shave removal with JOSE LUIS

## 2024-01-23 ENCOUNTER — APPOINTMENT (RX ONLY)
Dept: URBAN - METROPOLITAN AREA CLINIC 116 | Facility: CLINIC | Age: 44
Setting detail: DERMATOLOGY
End: 2024-01-23

## 2024-01-23 DIAGNOSIS — I87.2 VENOUS INSUFFICIENCY (CHRONIC) (PERIPHERAL): ICD-10-CM

## 2024-01-23 PROCEDURE — ? LOWER EXTREMITY DOPPLER US

## 2024-01-23 PROCEDURE — 93970 EXTREMITY STUDY: CPT

## 2024-01-23 NOTE — PROCEDURE: LOWER EXTREMITY DOPPLER US
Right Intraluminal Thrombus- No: The right deep veins were imaged from the level of the common femoral vein to the posterior tibial veins. All deep veins demonstrated compressibility without evidence of intraluminal thrombus.
Continue Conservative Therapy Text: Continue conservative treatment (such as compression stockings, OTC analgesics, and exercise) and consider intervention if no change or worsening symptoms to varicosities.
Size Options: Use Range
Intraluminal Thrombus: No
Continue Conservative Therapy: Yes
Left Intraluminal Thrombus- No: The left deep veins were imaged from the level of the common femoral vein to the posterior tibial veins. All deep veins demonstrated compressibility without evidence of intraluminal thrombus.
Left Intraluminal Thrombus- Yes: The left deep veins were imaged from the level of the common femoral vein to the posterior tibial veins. There was evidence of intraluminal thrombus as noted above.
Detail Level: Simple
Right Intraluminal Thrombus- Yes: The right deep veins were imaged from the level of the common femoral vein to the posterior tibial veins. There was evidence of intraluminal thrombus as noted above.
Use - 'see Attached Documentation' Verbiage?: Yes - Will Include Text Below and Will Remove Other Portions of the Note
See Attached Documentation Text: Please refer to the attached ultrasound documentation for complete details of the procedure and the venous findings.

## 2024-01-25 ENCOUNTER — APPOINTMENT (RX ONLY)
Dept: URBAN - METROPOLITAN AREA CLINIC 122 | Facility: CLINIC | Age: 44
Setting detail: DERMATOLOGY
End: 2024-01-25

## 2024-01-25 DIAGNOSIS — L81.8 OTHER SPECIFIED DISORDERS OF PIGMENTATION: ICD-10-CM

## 2024-01-25 DIAGNOSIS — I87.2 VENOUS INSUFFICIENCY (CHRONIC) (PERIPHERAL): ICD-10-CM | Status: STABLE

## 2024-01-25 PROCEDURE — 99214 OFFICE O/P EST MOD 30 MIN: CPT

## 2024-01-25 PROCEDURE — ? COUNSELING

## 2024-01-25 PROCEDURE — ? TREATMENT COMPLETED

## 2024-01-25 PROCEDURE — ? MEDICAL CONSULTATION: VENOUS DISEASE

## 2024-01-25 PROCEDURE — ? RECOMMENDATIONS

## 2024-01-25 ASSESSMENT — LOCATION ZONE DERM: LOCATION ZONE: LEG

## 2024-01-25 ASSESSMENT — LOCATION SIMPLE DESCRIPTION DERM: LOCATION SIMPLE: LEFT PRETIBIAL REGION

## 2024-01-25 ASSESSMENT — LOCATION DETAILED DESCRIPTION DERM: LOCATION DETAILED: LEFT DISTAL PRETIBIAL REGION

## 2024-01-25 NOTE — PROCEDURE: MEDICAL CONSULTATION: VENOUS DISEASE
Right Leg Ulcer Diameter: 0- None
Right Leg Compression Therapy: 0- None or noncompliant
Include Ceap In The Note?: No
Left Leg Venous Hyperpigmentation: 0- None or focal low intensity (tan)
Include Right Vcss In Note: Yes
Other Plan: Preventative and Conservative Management
Detail Level: Simple
Left Leg Circumference: medium
Left Dorsalis Pedis Pulse: 2 (Easily palpable)

## 2024-01-25 NOTE — PROCEDURE: RECOMMENDATIONS
Detail Level: Zone
Recommendation Preamble: The following recommendations were made during the visit:
Recommendations (Free Text): Patient should schedule for their 1yr post procedure follow up.
Render Risk Assessment In Note?: no
Patient Management Risk Assessment: Moderate

## 2024-03-26 ENCOUNTER — APPOINTMENT (RX ONLY)
Dept: URBAN - METROPOLITAN AREA CLINIC 116 | Facility: CLINIC | Age: 44
Setting detail: DERMATOLOGY
End: 2024-03-26

## 2024-03-26 DIAGNOSIS — I87.2 VENOUS INSUFFICIENCY (CHRONIC) (PERIPHERAL): ICD-10-CM

## 2024-03-26 PROCEDURE — ? LOWER EXTREMITY DOPPLER US

## 2024-03-26 PROCEDURE — 93970 EXTREMITY STUDY: CPT

## 2024-03-26 NOTE — PROCEDURE: LOWER EXTREMITY DOPPLER US
Is The Patient In A Global Period?: No
Left Intraluminal Thrombus- Yes: The left deep veins were imaged from the level of the common femoral vein to the posterior tibial veins. There was evidence of intraluminal thrombus as noted above.
Right Intraluminal Thrombus- Yes: The right deep veins were imaged from the level of the common femoral vein to the posterior tibial veins. There was evidence of intraluminal thrombus as noted above.
Size Options: Use Range
Add Milliseconds Of Reflux To Note?: Yes
Detail Level: Simple
Left Intraluminal Thrombus- No: The left deep veins were imaged from the level of the common femoral vein to the posterior tibial veins. All deep veins demonstrated compressibility without evidence of intraluminal thrombus.
See Attached Documentation Text: Please refer to the attached ultrasound documentation for complete details of the procedure and the venous findings.
Right Intraluminal Thrombus- No: The right deep veins were imaged from the level of the common femoral vein to the posterior tibial veins. All deep veins demonstrated compressibility without evidence of intraluminal thrombus.
Continue Conservative Therapy Text: Continue conservative treatment (such as compression stockings, OTC analgesics, and exercise) and consider intervention if no change or worsening symptoms to varicosities.

## 2024-03-26 NOTE — HPI: VEIN EVALUATION
Which Leg Is Worse?: Right
How Severe Is/Are Your Symptoms?: mild
Is This A New Presentation, Or A Follow-Up?: Follow Up Venous Evaluation

## 2024-04-04 ENCOUNTER — APPOINTMENT (RX ONLY)
Dept: URBAN - METROPOLITAN AREA CLINIC 122 | Facility: CLINIC | Age: 44
Setting detail: DERMATOLOGY
End: 2024-04-04

## 2024-04-04 DIAGNOSIS — I87.2 VENOUS INSUFFICIENCY (CHRONIC) (PERIPHERAL): ICD-10-CM | Status: STABLE

## 2024-04-04 PROCEDURE — 99214 OFFICE O/P EST MOD 30 MIN: CPT

## 2024-04-04 PROCEDURE — ? TREATMENT COMPLETED

## 2024-04-04 PROCEDURE — ? RECOMMENDATIONS

## 2024-04-04 PROCEDURE — ? MEDICAL CONSULTATION: VENOUS DISEASE

## 2024-04-04 NOTE — PROCEDURE: RECOMMENDATIONS
Detail Level: Zone
Render Risk Assessment In Note?: no
Patient Management Risk Assessment: Moderate
Recommendation Preamble: The following recommendations were made during the visit:\\n-Recommend she wear compression stockings on any long car ride or flight \\n-Recommend she follow up in 1 year unless she has any new or worsening symptoms.

## 2024-04-04 NOTE — PROCEDURE: MEDICAL CONSULTATION: VENOUS DISEASE
Include Right Vcss In Note: Yes
Left Leg Venous Hyperpigmentation: 0- None or focal low intensity (tan)
Left Leg Inflammation: 0- None
Left Leg Circumference: medium
Other Plan: Preventative and Conservative Management
Left Leg Compression Therapy: 0- None or noncompliant
Include Ceap In The Note?: No
Right Dorsalis Pedis Pulse: 2 (Easily palpable)
Detail Level: Simple

## 2024-04-11 ENCOUNTER — RX ONLY (OUTPATIENT)
Age: 44
Setting detail: RX ONLY
End: 2024-04-11

## 2024-04-11 RX ORDER — METRONIDAZOLE 7.5 MG/ML
1 LOTION TOPICAL QD
Qty: 59 | Refills: 3 | Status: ERX | COMMUNITY
Start: 2024-04-11

## 2024-07-30 ENCOUNTER — APPOINTMENT (RX ONLY)
Dept: URBAN - METROPOLITAN AREA CLINIC 121 | Facility: CLINIC | Age: 44
Setting detail: DERMATOLOGY
End: 2024-07-30

## 2024-07-30 DIAGNOSIS — L81.4 OTHER MELANIN HYPERPIGMENTATION: ICD-10-CM

## 2024-07-30 DIAGNOSIS — L82.1 OTHER SEBORRHEIC KERATOSIS: ICD-10-CM

## 2024-07-30 DIAGNOSIS — L70.0 ACNE VULGARIS: ICD-10-CM | Status: INADEQUATELY CONTROLLED

## 2024-07-30 DIAGNOSIS — D18.0 HEMANGIOMA: ICD-10-CM

## 2024-07-30 DIAGNOSIS — Z80.8 FAMILY HISTORY OF MALIGNANT NEOPLASM OF OTHER ORGANS OR SYSTEMS: ICD-10-CM

## 2024-07-30 PROBLEM — D18.01 HEMANGIOMA OF SKIN AND SUBCUTANEOUS TISSUE: Status: ACTIVE | Noted: 2024-07-30

## 2024-07-30 PROCEDURE — ? PRESCRIPTION MEDICATION MANAGEMENT

## 2024-07-30 PROCEDURE — ? PRESCRIPTION

## 2024-07-30 PROCEDURE — 99214 OFFICE O/P EST MOD 30 MIN: CPT

## 2024-07-30 PROCEDURE — ? COUNSELING

## 2024-07-30 RX ORDER — AZELAIC ACID 0.15 G/G
GEL TOPICAL
Qty: 50 | Refills: 6 | Status: ERX | COMMUNITY
Start: 2024-07-30

## 2024-07-30 RX ORDER — CLASCOTERONE 1 G/100G
CREAM TOPICAL QAM
Qty: 60 | Refills: 4 | Status: ERX | COMMUNITY
Start: 2024-07-30

## 2024-07-30 RX ADMIN — CLASCOTERONE: 1 CREAM TOPICAL at 00:00

## 2024-07-30 RX ADMIN — AZELAIC ACID: 0.15 GEL TOPICAL at 00:00

## 2024-07-30 ASSESSMENT — LOCATION DETAILED DESCRIPTION DERM
LOCATION DETAILED: PERIUMBILICAL SKIN
LOCATION DETAILED: LEFT SUPERIOR MEDIAL UPPER BACK
LOCATION DETAILED: SUPERIOR THORACIC SPINE

## 2024-07-30 ASSESSMENT — LOCATION SIMPLE DESCRIPTION DERM
LOCATION SIMPLE: ABDOMEN
LOCATION SIMPLE: UPPER BACK
LOCATION SIMPLE: LEFT UPPER BACK

## 2024-07-30 ASSESSMENT — SEVERITY ASSESSMENT OVERALL AMONG ALL PATIENTS
IN YOUR EXPERIENCE, AMONG ALL PATIENTS YOU HAVE SEEN WITH THIS CONDITION, HOW SEVERE IS THIS PATIENT'S CONDITION?: MULTIPLE INFLAMMATORY LESIONS BUT NONINFLAMMATORY LESIONS PREDOMINATE

## 2024-07-30 ASSESSMENT — LOCATION ZONE DERM: LOCATION ZONE: TRUNK

## 2024-07-30 NOTE — PROCEDURE: PRESCRIPTION MEDICATION MANAGEMENT
Render In Strict Bullet Format?: Yes
Detail Level: Zone
Plan: Ok to call in Doxycycline if topicals fail to improve
Initiate Treatment: Azelaic Acid gel - apply to face once daily \\nWinlevi - apply to face twice daily after washing

## 2024-07-30 NOTE — PROCEDURE: COUNSELING
Detail Level: Detailed
Detail Level: Generalized
Detail Level: Zone
Bactrim Counseling:  I discussed with the patient the risks of sulfa antibiotics including but not limited to GI upset, allergic reaction, drug rash, diarrhea, dizziness, photosensitivity, and yeast infections.  Rarely, more serious reactions can occur including but not limited to aplastic anemia, agranulocytosis, methemoglobinemia, blood dyscrasias, liver or kidney failure, lung infiltrates or desquamative/blistering drug rashes.
High Dose Vitamin A Pregnancy And Lactation Text: High dose vitamin A therapy is contraindicated during pregnancy and breast feeding.
Benzoyl Peroxide Counseling: Patient counseled that medicine may cause skin irritation and bleach clothing.  In the event of skin irritation, the patient was advised to reduce the amount of the drug applied or use it less frequently.   The patient verbalized understanding of the proper use and possible adverse effects of benzoyl peroxide.  All of the patient's questions and concerns were addressed.
Dapsone Counseling: I discussed with the patient the risks of dapsone including but not limited to hemolytic anemia, agranulocytosis, rashes, methemoglobinemia, kidney failure, peripheral neuropathy, headaches, GI upset, and liver toxicity.  Patients who start dapsone require monitoring including baseline LFTs and weekly CBCs for the first month, then every month thereafter.  The patient verbalized understanding of the proper use and possible adverse effects of dapsone.  All of the patient's questions and concerns were addressed.
Sarecycline Pregnancy And Lactation Text: This medication is Pregnancy Category D and not consider safe during pregnancy. It is also excreted in breast milk.
Erythromycin Counseling:  I discussed with the patient the risks of erythromycin including but not limited to GI upset, allergic reaction, drug rash, diarrhea, increase in liver enzymes, and yeast infections.
Include Pregnancy/Lactation Warning?: No
Tazorac Counseling:  Patient advised that medication is irritating and drying.  Patient may need to apply sparingly and wash off after an hour before eventually leaving it on overnight.  The patient verbalized understanding of the proper use and possible adverse effects of tazorac.  All of the patient's questions and concerns were addressed.
Azithromycin Pregnancy And Lactation Text: This medication is considered safe during pregnancy and is also secreted in breast milk.
High Dose Vitamin A Counseling: Side effects reviewed, pt to contact office should one occur.
Topical Sulfur Applications Counseling: Topical Sulfur Counseling: Patient counseled that this medication may cause skin irritation or allergic reactions.  In the event of skin irritation, the patient was advised to reduce the amount of the drug applied or use it less frequently.   The patient verbalized understanding of the proper use and possible adverse effects of topical sulfur application.  All of the patient's questions and concerns were addressed.
Azelaic Acid Pregnancy And Lactation Text: This medication is considered safe during pregnancy and breast feeding.
Birth Control Pills Pregnancy And Lactation Text: This medication should be avoided if pregnant and for the first 30 days post-partum.
Sarecycline Counseling: Patient advised regarding possible photosensitivity and discoloration of the teeth, skin, lips, tongue and gums.  Patient instructed to avoid sunlight, if possible.  When exposed to sunlight, patients should wear protective clothing, sunglasses, and sunscreen.  The patient was instructed to call the office immediately if the following severe adverse effects occur:  hearing changes, easy bruising/bleeding, severe headache, or vision changes.  The patient verbalized understanding of the proper use and possible adverse effects of sarecycline.  All of the patient's questions and concerns were addressed.
Doxycycline Pregnancy And Lactation Text: This medication is Pregnancy Category D and not consider safe during pregnancy. It is also excreted in breast milk but is considered safe for shorter treatment courses.
Topical Retinoid Pregnancy And Lactation Text: This medication is Pregnancy Category C. It is unknown if this medication is excreted in breast milk.
Tetracycline Counseling: Patient counseled regarding possible photosensitivity and increased risk for sunburn.  Patient instructed to avoid sunlight, if possible.  When exposed to sunlight, patients should wear protective clothing, sunglasses, and sunscreen.  The patient was instructed to call the office immediately if the following severe adverse effects occur:  hearing changes, easy bruising/bleeding, severe headache, or vision changes.  The patient verbalized understanding of the proper use and possible adverse effects of tetracycline.  All of the patient's questions and concerns were addressed. Patient understands to avoid pregnancy while on therapy due to potential birth defects.
Topical Clindamycin Pregnancy And Lactation Text: This medication is Pregnancy Category B and is considered safe during pregnancy. It is unknown if it is excreted in breast milk.
Isotretinoin Pregnancy And Lactation Text: This medication is Pregnancy Category X and is considered extremely dangerous during pregnancy. It is unknown if it is excreted in breast milk.
Azithromycin Counseling:  I discussed with the patient the risks of azithromycin including but not limited to GI upset, allergic reaction, drug rash, diarrhea, and yeast infections.
Birth Control Pills Counseling: Birth Control Pill Counseling: I discussed with the patient the potential side effects of OCPs including but not limited to increased risk of stroke, heart attack, thrombophlebitis, deep venous thrombosis, hepatic adenomas, breast changes, GI upset, headaches, and depression.  The patient verbalized understanding of the proper use and possible adverse effects of OCPs. All of the patient's questions and concerns were addressed.
Azelaic Acid Counseling: Patient counseled that medicine may cause skin irritation and to avoid applying near the eyes.  In the event of skin irritation, the patient was advised to reduce the amount of the drug applied or use it less frequently.   The patient verbalized understanding of the proper use and possible adverse effects of azelaic acid.  All of the patient's questions and concerns were addressed.
Winlevi Pregnancy And Lactation Text: This medication is considered safe during pregnancy and breastfeeding.
Topical Retinoid counseling:  Patient advised to apply a pea-sized amount only at bedtime and wait 30 minutes after washing their face before applying.  If too drying, patient may add a non-comedogenic moisturizer. The patient verbalized understanding of the proper use and possible adverse effects of retinoids.  All of the patient's questions and concerns were addressed.
Doxycycline Counseling:  Patient counseled regarding possible photosensitivity and increased risk for sunburn.  Patient instructed to avoid sunlight, if possible.  When exposed to sunlight, patients should wear protective clothing, sunglasses, and sunscreen.  The patient was instructed to call the office immediately if the following severe adverse effects occur:  hearing changes, easy bruising/bleeding, severe headache, or vision changes.  The patient verbalized understanding of the proper use and possible adverse effects of doxycycline.  All of the patient's questions and concerns were addressed.
Spironolactone Pregnancy And Lactation Text: This medication can cause feminization of the male fetus and should be avoided during pregnancy. The active metabolite is also found in breast milk.
Isotretinoin Counseling: Patient should get monthly blood tests, not donate blood, not drive at night if vision affected, not share medication, and not undergo elective surgery for 6 months after tx completed. Side effects reviewed, pt to contact office should one occur.
Topical Clindamycin Counseling: Patient counseled that this medication may cause skin irritation or allergic reactions.  In the event of skin irritation, the patient was advised to reduce the amount of the drug applied or use it less frequently.   The patient verbalized understanding of the proper use and possible adverse effects of clindamycin.  All of the patient's questions and concerns were addressed.
Aklief Pregnancy And Lactation Text: It is unknown if this medication is safe to use during pregnancy.  It is unknown if this medication is excreted in breast milk.  Breastfeeding women should use the topical cream on the smallest area of the skin for the shortest time needed while breastfeeding.  Do not apply to nipple and areola.
Bactrim Pregnancy And Lactation Text: This medication is Pregnancy Category D and is known to cause fetal risk.  It is also excreted in breast milk.
Minocycline Counseling: Patient advised regarding possible photosensitivity and discoloration of the teeth, skin, lips, tongue and gums.  Patient instructed to avoid sunlight, if possible.  When exposed to sunlight, patients should wear protective clothing, sunglasses, and sunscreen.  The patient was instructed to call the office immediately if the following severe adverse effects occur:  hearing changes, easy bruising/bleeding, severe headache, or vision changes.  The patient verbalized understanding of the proper use and possible adverse effects of minocycline.  All of the patient's questions and concerns were addressed.
Winlevi Counseling:  I discussed with the patient the risks of topical clascoterone including but not limited to erythema, scaling, itching, and stinging. Patient voiced their understanding.
Benzoyl Peroxide Pregnancy And Lactation Text: This medication is Pregnancy Category C. It is unknown if benzoyl peroxide is excreted in breast milk.
Dapsone Pregnancy And Lactation Text: This medication is Pregnancy Category C and is not considered safe during pregnancy or breast feeding.
Spironolactone Counseling: Patient advised regarding risks of diarrhea, abdominal pain, hyperkalemia, birth defects (for female patients), liver toxicity and renal toxicity. The patient may need blood work to monitor liver and kidney function and potassium levels while on therapy. The patient verbalized understanding of the proper use and possible adverse effects of spironolactone.  All of the patient's questions and concerns were addressed.
Erythromycin Pregnancy And Lactation Text: This medication is Pregnancy Category B and is considered safe during pregnancy. It is also excreted in breast milk.
Tazorac Pregnancy And Lactation Text: This medication is not safe during pregnancy. It is unknown if this medication is excreted in breast milk.
Aklief counseling:  Patient advised to apply a pea-sized amount only at bedtime and wait 30 minutes after washing their face before applying.  If too drying, patient may add a non-comedogenic moisturizer.  The most commonly reported side effects including irritation, redness, scaling, dryness, stinging, burning, itching, and increased risk of sunburn.  The patient verbalized understanding of the proper use and possible adverse effects of retinoids.  All of the patient's questions and concerns were addressed.
Topical Sulfur Applications Pregnancy And Lactation Text: This medication is Pregnancy Category C and has an unknown safety profile during pregnancy. It is unknown if this topical medication is excreted in breast milk.

## 2025-02-06 ENCOUNTER — P2P PATIENT RECORD (OUTPATIENT)
Age: 45
End: 2025-02-06

## 2025-02-07 ENCOUNTER — TELEPHONE ENCOUNTER (OUTPATIENT)
Dept: URBAN - METROPOLITAN AREA CLINIC 63 | Facility: CLINIC | Age: 45
End: 2025-02-07

## 2025-02-17 ENCOUNTER — OFFICE VISIT (OUTPATIENT)
Dept: URBAN - METROPOLITAN AREA CLINIC 60 | Facility: CLINIC | Age: 45
End: 2025-02-17

## 2025-02-17 RX ORDER — ATOGEPANT 60 MG/1
TABLET ORAL
Qty: 90 TABLET | COMMUNITY

## 2025-02-17 RX ORDER — ROSUVASTATIN CALCIUM 20 MG/1
TABLET, FILM COATED ORAL
Qty: 90 TABLET | COMMUNITY

## 2025-02-17 RX ORDER — LACOSAMIDE 200 MG/1
TAKE 1 TABLET BY MOUTH TWICE DAILY TABLET, FILM COATED ORAL
Qty: 180 EACH | Refills: 0 | COMMUNITY

## 2025-02-17 RX ORDER — UBROGEPANT 100 MG/1
TABLET ORAL
Qty: 48 TABLET | COMMUNITY

## 2025-02-17 RX ORDER — ESTRADIOL 10 UG/1
INSERT 1 TABLET VAGINALLY THREE TIMES A WEEK TABLET VAGINAL
Qty: 36 EACH | Refills: 0 | COMMUNITY

## 2025-02-17 RX ORDER — BRIVARACETAM 50 MG/1
TAKE 1 TABLET BY MOUTH TWICE DAILY TABLET, FILM COATED ORAL
Qty: 60 EACH | Refills: 0 | COMMUNITY

## 2025-02-25 ENCOUNTER — DASHBOARD ENCOUNTERS (OUTPATIENT)
Age: 45
End: 2025-02-25

## 2025-02-25 ENCOUNTER — LAB OUTSIDE AN ENCOUNTER (OUTPATIENT)
Dept: URBAN - METROPOLITAN AREA CLINIC 60 | Facility: CLINIC | Age: 45
End: 2025-02-25

## 2025-02-25 ENCOUNTER — OFFICE VISIT (OUTPATIENT)
Dept: URBAN - METROPOLITAN AREA CLINIC 60 | Facility: CLINIC | Age: 45
End: 2025-02-25
Payer: MEDICARE

## 2025-02-25 VITALS
WEIGHT: 186.2 LBS | SYSTOLIC BLOOD PRESSURE: 122 MMHG | HEART RATE: 88 BPM | DIASTOLIC BLOOD PRESSURE: 68 MMHG | OXYGEN SATURATION: 98 % | HEIGHT: 64 IN | RESPIRATION RATE: 12 BRPM | BODY MASS INDEX: 31.79 KG/M2 | TEMPERATURE: 98.7 F

## 2025-02-25 DIAGNOSIS — K59.04 CHRONIC IDIOPATHIC CONSTIPATION: ICD-10-CM

## 2025-02-25 DIAGNOSIS — Z12.11 COLON CANCER SCREENING: ICD-10-CM

## 2025-02-25 PROBLEM — 82934008: Status: ACTIVE | Noted: 2025-02-25

## 2025-02-25 PROCEDURE — 99203 OFFICE O/P NEW LOW 30 MIN: CPT

## 2025-02-25 RX ORDER — DROSPIRENONE 4 MG/1
1 TABLET TABLET, FILM COATED ORAL ONCE A DAY
Qty: 30 | Status: ACTIVE | COMMUNITY
Start: 2025-02-25

## 2025-02-25 RX ORDER — UBROGEPANT 100 MG/1
TABLET ORAL
Qty: 48 TABLET | Status: ACTIVE | COMMUNITY

## 2025-02-25 RX ORDER — BRIVARACETAM 50 MG/1
TAKE 1 TABLET BY MOUTH TWICE DAILY TABLET, FILM COATED ORAL
Qty: 60 EACH | Refills: 0 | Status: ACTIVE | COMMUNITY

## 2025-02-25 RX ORDER — ATOGEPANT 60 MG/1
TABLET ORAL
Qty: 90 TABLET | Status: ACTIVE | COMMUNITY

## 2025-02-25 RX ORDER — POLYETHYLENE GLYCOL 3350, SODIUM CHLORIDE, SODIUM BICARBONATE, POTASSIUM CHLORIDE 420; 11.2; 5.72; 1.48 G/4L; G/4L; G/4L; G/4L
AS DIRECTED POWDER, FOR SOLUTION ORAL ONCE
Qty: 4000 | Refills: 0 | OUTPATIENT
Start: 2025-02-25 | End: 2025-02-26

## 2025-02-25 RX ORDER — LACOSAMIDE 200 MG/1
TAKE 1 TABLET BY MOUTH TWICE DAILY TABLET, FILM COATED ORAL
Qty: 180 EACH | Refills: 0 | Status: ACTIVE | COMMUNITY

## 2025-02-25 RX ORDER — ROSUVASTATIN CALCIUM 20 MG/1
TABLET, FILM COATED ORAL
Qty: 90 TABLET | Status: ACTIVE | COMMUNITY

## 2025-02-25 RX ORDER — ESTRADIOL 10 UG/1
INSERT 1 TABLET VAGINALLY THREE TIMES A WEEK TABLET VAGINAL
Qty: 36 EACH | Refills: 0 | Status: ACTIVE | COMMUNITY

## 2025-02-25 NOTE — HPI-TODAY'S VISIT:
Patient is a 44-year-old female coming in for colonoscopy consultation.  Past medical history of chronic migraines, benign brain lesion and nonepileptic seizures started 15 years ago currently following with neurologist, and nephrolithiasis.  No prior colonoscopy.  Denies family history of colon cancer.  Long history of constipation since childhood having a BM every other day.  Some intermittent associated lower abdominal bloating.  Otherwise denies any GI complaints.  Dr. Lindsay -neurologist  Denies any use of blood thinners, presence of pacemaker or defib. Denies history of sleep apnea. No prior MI, TIA, CVA or cardiac stenting. No known cardiac or pulmonary issues.

## 2025-04-10 ENCOUNTER — APPOINTMENT (OUTPATIENT)
Dept: URBAN - METROPOLITAN AREA CLINIC 122 | Facility: CLINIC | Age: 45
Setting detail: DERMATOLOGY
End: 2025-04-10

## 2025-04-10 DIAGNOSIS — I87.2 VENOUS INSUFFICIENCY (CHRONIC) (PERIPHERAL): ICD-10-CM | Status: STABLE

## 2025-04-10 DIAGNOSIS — I87.2 VENOUS INSUFFICIENCY (CHRONIC) (PERIPHERAL): ICD-10-CM

## 2025-04-10 PROCEDURE — ? LOWER EXTREMITY DOPPLER US

## 2025-04-10 PROCEDURE — 93971 EXTREMITY STUDY: CPT

## 2025-04-10 PROCEDURE — ? RECOMMENDATIONS

## 2025-04-10 PROCEDURE — 99214 OFFICE O/P EST MOD 30 MIN: CPT

## 2025-04-10 PROCEDURE — ? TREATMENT COMPLETED

## 2025-04-10 PROCEDURE — ? MEDICAL CONSULTATION: VENOUS DISEASE

## 2025-04-10 NOTE — PROCEDURE: LOWER EXTREMITY DOPPLER US
Left Ssv Fragmentation And Discontinuity: No
Continue Conservative Therapy Text: Continue conservative treatment (such as compression stockings, OTC analgesics, and exercise) and consider intervention if no change or worsening symptoms to varicosities.
Reflux Options: Use Range
Continue Conservative Therapy: Yes
Right Intraluminal Thrombus- Yes: The right deep veins were imaged from the level of the common femoral vein to the posterior tibial veins. There was evidence of intraluminal thrombus as noted above.
Is This A Limited Us Study?: Yes - Only Bill 72190 US Code
Left Intraluminal Thrombus- Yes: The left deep veins were imaged from the level of the common femoral vein to the posterior tibial veins. There was evidence of intraluminal thrombus as noted above.
Use - 'see Attached Documentation' Verbiage?: Yes - Will Include Text Below and Will Remove Other Portions of the Note
Right Intraluminal Thrombus- No: The right deep veins were imaged from the level of the common femoral vein to the posterior tibial veins. All deep veins demonstrated compressibility without evidence of intraluminal thrombus.
See Attached Documentation Text: Please refer to the attached ultrasound documentation for complete details of the procedure and the venous findings.
Left Intraluminal Thrombus- No: The left deep veins were imaged from the level of the common femoral vein to the posterior tibial veins. All deep veins demonstrated compressibility without evidence of intraluminal thrombus.
Detail Level: Simple

## 2025-04-10 NOTE — PROCEDURE: MEDICAL CONSULTATION: VENOUS DISEASE
Other Plan: Preservative and Conservative management
Left Leg Varicose Veins: 0- None
Include Right Vcss In Note: Yes
Etiology Set 2: Ec - Congenital
Include Vcss In The Note?: No
Left Leg Venous Hyperpigmentation: 0- None or focal low intensity (tan)
Pathophysiology Set 1: Pr - Reflux
Left Leg Circumference: medium
Follow Up Instructions:: I counseled the patient regarding the following:\\nContact office if new or worsening symptoms occur or if varicosities become painful, thrombosed, or red.
Left Leg Compression Therapy: 0- None or noncompliant
Clinical Classification Set 1: C0 - no visible or palpable signs of venous disease
Anatomy Set 1: As - Superficial Veins
Right Dorsalis Pedis Pulse: 2 (Easily palpable)
Detail Level: Detailed

## 2025-04-10 NOTE — PROCEDURE: RECOMMENDATIONS
Render Risk Assessment In Note?: no
Detail Level: Zone
Recommendation Preamble: The following recommendations were made during the visit:\\n-Rescan LT leg to see if there are any open TRIBS that could be affecting the LT medial lower leg

## 2025-04-24 ENCOUNTER — APPOINTMENT (OUTPATIENT)
Dept: URBAN - METROPOLITAN AREA CLINIC 122 | Facility: CLINIC | Age: 45
Setting detail: DERMATOLOGY
End: 2025-04-24

## 2025-04-24 DIAGNOSIS — I87.2 VENOUS INSUFFICIENCY (CHRONIC) (PERIPHERAL): ICD-10-CM

## 2025-04-24 PROCEDURE — 99214 OFFICE O/P EST MOD 30 MIN: CPT

## 2025-04-24 PROCEDURE — ? ADDITIONAL NOTES

## 2025-04-24 PROCEDURE — ? EDUCATIONAL RESOURCES PROVIDED

## 2025-04-24 PROCEDURE — ? RECOMMENDATIONS

## 2025-04-24 PROCEDURE — ? VEIN TREATMENT PLAN

## 2025-04-24 NOTE — PROCEDURE: RECOMMENDATIONS
Render Risk Assessment In Note?: no
Detail Level: Zone
Recommendation Preamble: The following recommendations were made during the visit:\\n-Complete a status post ultrasound with in one week after procedure.\\n-Contact the office at [phone] during business hours with any post procedure concerns or with any questions regarding your post care.

## 2025-04-24 NOTE — PROCEDURE: VEIN TREATMENT PLAN
Continue Conservative Therapy After U/S: No
Intro Statement (Will Not Render If Left Blank): Extensive discussion and education was undertaken during the office visit regarding pathophysiology, chronicity and long term prognosis of venous disease. We also discussed risk factors for venous hypertension, diagnostic testing and treatment. Our treatment discussion included conservative management, recommendations and expected follow-up. Patient was advised of their insurance plan requiring a conservative period of 3 months and will need to continue use of all conservative strategies discussed. All questions were answered and no further questions were verbalized by the patient at this time.
Micah Sessions - Left: 1
Continue Conservative Therapy Text: The patient has signs and symptoms of chronic venous hypertension affecting daily function with ultrasound of the lower extremities demonstrating venous insufficiency. The patient will continue conservative treatment and has been counseled on avoiding prolonged standing, leg elevation, analgesics, exercise weight management, and daily graduated compression stockings use (20-30mmHg or higher).
Detail Level: Zone
[place] Sessions - Left: 2

## 2025-04-24 NOTE — PROCEDURE: ADDITIONAL NOTES
Additional Notes: Will treat LT LOWER LEG TRIB/PERF with Varithena at [facility] per Dr. Linton.\\n\\nDanijose would like to wait and not treat at this time. Area on the lower leg doesn’t bother her too much. Plan is to see her back in 6 months to reevaluate.
Render Risk Assessment In Note?: no
Detail Level: Simple

## 2025-05-27 ENCOUNTER — TELEPHONE ENCOUNTER (OUTPATIENT)
Dept: URBAN - METROPOLITAN AREA CLINIC 63 | Facility: CLINIC | Age: 45
End: 2025-05-27

## 2025-06-04 ENCOUNTER — TELEPHONE ENCOUNTER (OUTPATIENT)
Dept: URBAN - METROPOLITAN AREA CLINIC 63 | Facility: CLINIC | Age: 45
End: 2025-06-04

## 2025-06-09 ENCOUNTER — CLAIMS CREATED FROM THE CLAIM WINDOW (OUTPATIENT)
Dept: URBAN - METROPOLITAN AREA SURGERY CENTER 4 | Facility: SURGERY CENTER | Age: 45
End: 2025-06-09
Payer: MEDICARE

## 2025-06-09 DIAGNOSIS — Z12.11 COLON CANCER SCREENING: ICD-10-CM

## 2025-06-09 DIAGNOSIS — Z12.11 ENCOUNTER FOR SCREENING FOR MALIGNANT NEOPLASM OF COLON: ICD-10-CM

## 2025-06-09 DIAGNOSIS — K64.1 SECOND DEGREE HEMORRHOIDS: ICD-10-CM

## 2025-06-09 DIAGNOSIS — K55.20 ANGIODYSPLASIA OF COLON WITHOUT HEMORRHAGE: ICD-10-CM

## 2025-06-09 PROCEDURE — 00812 ANES LWR INTST SCR COLSC: CPT | Performed by: NURSE ANESTHETIST, CERTIFIED REGISTERED

## 2025-06-09 PROCEDURE — G0121 COLON CA SCRN NOT HI RSK IND: HCPCS | Performed by: INTERNAL MEDICINE

## 2025-06-09 RX ORDER — UBROGEPANT 100 MG/1
TABLET ORAL
Qty: 48 TABLET | Status: ACTIVE | COMMUNITY

## 2025-06-09 RX ORDER — DROSPIRENONE 4 MG/1
1 TABLET TABLET, FILM COATED ORAL ONCE A DAY
Qty: 30 | Status: ACTIVE | COMMUNITY
Start: 2025-02-25

## 2025-06-09 RX ORDER — ATOGEPANT 60 MG/1
TABLET ORAL
Qty: 90 TABLET | Status: ACTIVE | COMMUNITY

## 2025-06-09 RX ORDER — LACOSAMIDE 200 MG/1
TAKE 1 TABLET BY MOUTH TWICE DAILY TABLET, FILM COATED ORAL
Qty: 180 EACH | Refills: 0 | Status: ACTIVE | COMMUNITY

## 2025-06-09 RX ORDER — ESTRADIOL 10 UG/1
INSERT 1 TABLET VAGINALLY THREE TIMES A WEEK TABLET VAGINAL
Qty: 36 EACH | Refills: 0 | Status: ACTIVE | COMMUNITY

## 2025-06-09 RX ORDER — BRIVARACETAM 50 MG/1
TAKE 1 TABLET BY MOUTH TWICE DAILY TABLET, FILM COATED ORAL
Qty: 60 EACH | Refills: 0 | Status: ACTIVE | COMMUNITY

## 2025-06-09 RX ORDER — ROSUVASTATIN CALCIUM 20 MG/1
TABLET, FILM COATED ORAL
Qty: 90 TABLET | Status: ACTIVE | COMMUNITY

## 2025-06-12 NOTE — PROCEDURE: LOWER EXTREMITY DOPPLER US
[FreeTextEntry1] : 30 mins were spent: - Reviewing the patient chart, past history, labs, images, medications - counseling the patient as to the risks, benefits, and alternatives of treatment - counseling patient on follow-up appointments and treatment course - documenting salient information during this encounter 
Recommend Sclerotherapy With Ultrasound Guidance On Right Side: No
Left Intraluminal Thrombus- Yes: The left deep veins were imaged from the level of the common femoral vein to the posterior tibial veins. There was evidence of intraluminal thrombus as noted above.
See Attached Documentation Text: Please refer to the attached ultrasound documentation for complete details of the procedure and the venous findings. \\n\\n** Photographs obtained showing the right CFV, and Popliteal compressible and without obstruction.
Left Intraluminal Thrombus- No: The left deep veins were imaged from the level of the common femoral vein to the posterior tibial veins. All deep veins demonstrated compressibility without evidence of intraluminal thrombus.
Size Options: Use Range
Continue Post-Procedural Therapy: Yes
Use - 'see Attached Documentation' Verbiage?: Yes - Will Include Text Below and Will Remove Other Portions of the Note
Right Intraluminal Thrombus- No: The right deep veins were imaged from the level of the common femoral vein to the posterior tibial veins. All deep veins demonstrated compressibility without evidence of intraluminal thrombus.
Detail Level: Simple
Continue Conservative Therapy Text: Continue conservative treatment (such as compression stockings, OTC analgesics, and exercise) and consider intervention if no change or worsening symptoms to varicosities.
Right Intraluminal Thrombus- Yes: The right deep veins were imaged from the level of the common femoral vein to the posterior tibial veins. There was evidence of intraluminal thrombus as noted above.

## 2025-06-23 ENCOUNTER — TELEPHONE ENCOUNTER (OUTPATIENT)
Dept: URBAN - METROPOLITAN AREA CLINIC 60 | Facility: CLINIC | Age: 45
End: 2025-06-23

## 2025-06-23 ENCOUNTER — OFFICE VISIT (OUTPATIENT)
Dept: URBAN - METROPOLITAN AREA CLINIC 60 | Facility: CLINIC | Age: 45
End: 2025-06-23

## 2025-06-23 RX ORDER — DROSPIRENONE 4 MG/1
1 TABLET TABLET, FILM COATED ORAL ONCE A DAY
Qty: 30 | COMMUNITY
Start: 2025-02-25

## 2025-06-23 RX ORDER — ROSUVASTATIN CALCIUM 20 MG/1
TABLET, FILM COATED ORAL
Qty: 90 TABLET | COMMUNITY

## 2025-06-23 RX ORDER — UBROGEPANT 100 MG/1
TABLET ORAL
Qty: 48 TABLET | COMMUNITY

## 2025-06-23 RX ORDER — ATOGEPANT 60 MG/1
TABLET ORAL
Qty: 90 TABLET | COMMUNITY

## 2025-06-23 RX ORDER — BRIVARACETAM 50 MG/1
TAKE 1 TABLET BY MOUTH TWICE DAILY TABLET, FILM COATED ORAL
Qty: 60 EACH | Refills: 0 | COMMUNITY

## 2025-06-23 RX ORDER — ESTRADIOL 10 UG/1
INSERT 1 TABLET VAGINALLY THREE TIMES A WEEK TABLET VAGINAL
Qty: 36 EACH | Refills: 0 | COMMUNITY

## 2025-06-23 RX ORDER — LACOSAMIDE 200 MG/1
TAKE 1 TABLET BY MOUTH TWICE DAILY TABLET, FILM COATED ORAL
Qty: 180 EACH | Refills: 0 | COMMUNITY

## 2025-06-24 ENCOUNTER — OFFICE VISIT (OUTPATIENT)
Dept: URBAN - METROPOLITAN AREA CLINIC 60 | Facility: CLINIC | Age: 45
End: 2025-06-24
Payer: MEDICARE

## 2025-06-24 DIAGNOSIS — Z12.11 COLON CANCER SCREENING: ICD-10-CM

## 2025-06-24 DIAGNOSIS — K59.04 CHRONIC IDIOPATHIC CONSTIPATION: ICD-10-CM

## 2025-06-24 PROCEDURE — 99213 OFFICE O/P EST LOW 20 MIN: CPT

## 2025-06-24 RX ORDER — ROSUVASTATIN CALCIUM 20 MG/1
TABLET, FILM COATED ORAL
Qty: 90 TABLET | Status: ACTIVE | COMMUNITY

## 2025-06-24 RX ORDER — ATOGEPANT 60 MG/1
TABLET ORAL
Qty: 90 TABLET | Status: ACTIVE | COMMUNITY

## 2025-06-24 RX ORDER — DROSPIRENONE 4 MG/1
1 TABLET TABLET, FILM COATED ORAL ONCE A DAY
Qty: 30 | Status: ACTIVE | COMMUNITY
Start: 2025-02-25

## 2025-06-24 RX ORDER — ESTRADIOL 10 UG/1
INSERT 1 TABLET VAGINALLY THREE TIMES A WEEK TABLET VAGINAL
Qty: 36 EACH | Refills: 0 | Status: ACTIVE | COMMUNITY

## 2025-06-24 RX ORDER — UBROGEPANT 100 MG/1
TABLET ORAL
Qty: 48 TABLET | Status: ACTIVE | COMMUNITY

## 2025-06-24 RX ORDER — LACOSAMIDE 200 MG/1
TAKE 1 TABLET BY MOUTH TWICE DAILY TABLET, FILM COATED ORAL
Qty: 180 EACH | Refills: 0 | Status: ACTIVE | COMMUNITY

## 2025-06-24 NOTE — HPI-TODAY'S VISIT:
Patient is here for f/u colonoscopy. Fair colon preparation so we will plan for repeat colonoscopy 6/2026.  We reviewed the results at today's visit.  I answered all questions to the best of my ability.  Patient still with irregular bowel habits typically going every other day but has no discomfort or complaints.  This has been her rhythm all of her life.  She recently reports she went to [place] and did not have a BM for a few days and took a laxative and had a regular BM following that.  Denies any brbpr, melena, abdominal pain, unintentional weight loss, early satiety, fever, chills, diarrhea, dysphagia, odynophagia, or reflux.   OV 3/2025: Patient is a 44-year-old female coming in for colonoscopy consultation.  Past medical history of chronic migraines, benign brain lesion and nonepileptic seizures started 15 years ago currently following with neurologist, and nephrolithiasis.  No prior colonoscopy.  Denies family history of colon cancer.  Long history of constipation since childhood having a BM every other day.  Some intermittent associated lower abdominal bloating.  Otherwise denies any GI complaints.  Dr. Lindsay -neurologist  Denies any use of blood thinners, presence of pacemaker or defib. Denies history of sleep apnea. No prior MI, TIA, CVA or cardiac stenting. No known cardiac or pulmonary issues.

## 2025-06-24 NOTE — HPI-PREVIOUS PROCEDURES
Colonoscopy 6/9/2025 by Dr. Armstrong - Fair colon preparation - 1 nonbleeding colonic angioectasia - Nonbleeding external and internal hemorrhoids - No specimens collected - Repeat 1 year due to suboptimal bowel preparation

## 2025-07-31 ENCOUNTER — APPOINTMENT (OUTPATIENT)
Dept: URBAN - METROPOLITAN AREA CLINIC 121 | Facility: CLINIC | Age: 45
Setting detail: DERMATOLOGY
End: 2025-07-31

## 2025-07-31 DIAGNOSIS — L81.4 OTHER MELANIN HYPERPIGMENTATION: ICD-10-CM

## 2025-07-31 DIAGNOSIS — D18.0 HEMANGIOMA: ICD-10-CM

## 2025-07-31 DIAGNOSIS — D49.2 NEOPLASM OF UNSPECIFIED BEHAVIOR OF BONE, SOFT TISSUE, AND SKIN: ICD-10-CM

## 2025-07-31 DIAGNOSIS — L70.0 ACNE VULGARIS: ICD-10-CM | Status: WELL CONTROLLED

## 2025-07-31 DIAGNOSIS — D22 MELANOCYTIC NEVI: ICD-10-CM

## 2025-07-31 PROBLEM — D22.5 MELANOCYTIC NEVI OF TRUNK: Status: ACTIVE | Noted: 2025-07-31

## 2025-07-31 PROBLEM — D18.01 HEMANGIOMA OF SKIN AND SUBCUTANEOUS TISSUE: Status: ACTIVE | Noted: 2025-07-31

## 2025-07-31 PROCEDURE — ? SHAVE REMOVAL

## 2025-07-31 PROCEDURE — ? PRESCRIPTION MEDICATION MANAGEMENT

## 2025-07-31 PROCEDURE — ?

## 2025-07-31 PROCEDURE — ? SUNSCREEN RECOMMENDATIONS

## 2025-07-31 PROCEDURE — ? COUNSELING

## 2025-07-31 PROCEDURE — ?: Mod: 25

## 2025-07-31 ASSESSMENT — LOCATION SIMPLE DESCRIPTION DERM
LOCATION SIMPLE: RIGHT LOWER BACK
LOCATION SIMPLE: POSTERIOR SCALP
LOCATION SIMPLE: ABDOMEN
LOCATION SIMPLE: LEFT CHEEK
LOCATION SIMPLE: RIGHT UPPER BACK

## 2025-07-31 ASSESSMENT — LOCATION ZONE DERM
LOCATION ZONE: SCALP
LOCATION ZONE: FACE
LOCATION ZONE: TRUNK

## 2025-07-31 ASSESSMENT — LOCATION DETAILED DESCRIPTION DERM
LOCATION DETAILED: RIGHT SUPERIOR MEDIAL MIDBACK
LOCATION DETAILED: LEFT POSTAURICULAR SKIN
LOCATION DETAILED: EPIGASTRIC SKIN
LOCATION DETAILED: RIGHT INFERIOR MEDIAL UPPER BACK
LOCATION DETAILED: LEFT INFERIOR CENTRAL MALAR CHEEK

## 2025-07-31 NOTE — PROCEDURE: COUNSELING
Detail Level: Generalized
Detail Level: Zone
Sarecycline Pregnancy And Lactation Text: This medication is Pregnancy Category D and not consider safe during pregnancy. It is also excreted in breast milk.
Tazorac Counseling:  Patient advised that medication is irritating and drying.  Patient may need to apply sparingly and wash off after an hour before eventually leaving it on overnight.  The patient verbalized understanding of the proper use and possible adverse effects of tazorac.  All of the patient's questions and concerns were addressed.
Doxycycline Counseling:  Patient counseled regarding possible photosensitivity and increased risk for sunburn.  Patient instructed to avoid sunlight, if possible.  When exposed to sunlight, patients should wear protective clothing, sunglasses, and sunscreen.  The patient was instructed to call the office immediately if the following severe adverse effects occur:  hearing changes, easy bruising/bleeding, severe headache, or vision changes.  The patient verbalized understanding of the proper use and possible adverse effects of doxycycline.  All of the patient's questions and concerns were addressed.
Dapsone Counseling: I discussed with the patient the risks of dapsone including but not limited to hemolytic anemia, agranulocytosis, rashes, methemoglobinemia, kidney failure, peripheral neuropathy, headaches, GI upset, and liver toxicity.  Patients who start dapsone require monitoring including baseline LFTs and weekly CBCs for the first month, then every month thereafter.  The patient verbalized understanding of the proper use and possible adverse effects of dapsone.  All of the patient's questions and concerns were addressed.
Topical Clindamycin Counseling: Patient counseled that this medication may cause skin irritation or allergic reactions.  In the event of skin irritation, the patient was advised to reduce the amount of the drug applied or use it less frequently.   The patient verbalized understanding of the proper use and possible adverse effects of clindamycin.  All of the patient's questions and concerns were addressed.
Topical Retinoid counseling:  Patient advised to apply a pea-sized amount only at bedtime and wait 30 minutes after washing their face before applying.  If too drying, patient may add a non-comedogenic moisturizer. The patient verbalized understanding of the proper use and possible adverse effects of retinoids.  All of the patient's questions and concerns were addressed.
Birth Control Pills Counseling: Birth Control Pill Counseling: I discussed with the patient the potential side effects of OCPs including but not limited to increased risk of stroke, heart attack, thrombophlebitis, deep venous thrombosis, hepatic adenomas, breast changes, GI upset, headaches, and depression.  The patient verbalized understanding of the proper use and possible adverse effects of OCPs. All of the patient's questions and concerns were addressed.
Bactrim Counseling:  I discussed with the patient the risks of sulfa antibiotics including but not limited to GI upset, allergic reaction, drug rash, diarrhea, dizziness, photosensitivity, and yeast infections.  Rarely, more serious reactions can occur including but not limited to aplastic anemia, agranulocytosis, methemoglobinemia, blood dyscrasias, liver or kidney failure, lung infiltrates or desquamative/blistering drug rashes.
High Dose Vitamin A Pregnancy And Lactation Text: High dose vitamin A therapy is contraindicated during pregnancy and breast feeding.
Azithromycin Counseling:  I discussed with the patient the risks of azithromycin including but not limited to GI upset, allergic reaction, drug rash, diarrhea, and yeast infections.
Azelaic Acid Counseling: Patient counseled that medicine may cause skin irritation and to avoid applying near the eyes.  In the event of skin irritation, the patient was advised to reduce the amount of the drug applied or use it less frequently.   The patient verbalized understanding of the proper use and possible adverse effects of azelaic acid.  All of the patient's questions and concerns were addressed.
Isotretinoin Pregnancy And Lactation Text: This medication is Pregnancy Category X and is considered extremely dangerous during pregnancy. It is unknown if it is excreted in breast milk.
Benzoyl Peroxide Counseling: Patient counseled that medicine may cause skin irritation and bleach clothing.  In the event of skin irritation, the patient was advised to reduce the amount of the drug applied or use it less frequently.   The patient verbalized understanding of the proper use and possible adverse effects of benzoyl peroxide.  All of the patient's questions and concerns were addressed.
Erythromycin Pregnancy And Lactation Text: This medication is Pregnancy Category B and is considered safe during pregnancy. It is also excreted in breast milk.
Winlevi Pregnancy And Lactation Text: This medication is considered safe during pregnancy and breastfeeding.
Doxycycline Pregnancy And Lactation Text: This medication is Pregnancy Category D and not consider safe during pregnancy. It is also excreted in breast milk but is considered safe for shorter treatment courses.
Topical Clindamycin Pregnancy And Lactation Text: This medication is Pregnancy Category B and is considered safe during pregnancy. It is unknown if it is excreted in breast milk.
Dapsone Pregnancy And Lactation Text: This medication is Pregnancy Category C and is not considered safe during pregnancy or breast feeding.
Topical Sulfur Applications Pregnancy And Lactation Text: This medication is Pregnancy Category C and has an unknown safety profile during pregnancy. It is unknown if this topical medication is excreted in breast milk.
Tetracycline Counseling: Patient counseled regarding possible photosensitivity and increased risk for sunburn.  Patient instructed to avoid sunlight, if possible.  When exposed to sunlight, patients should wear protective clothing, sunglasses, and sunscreen.  The patient was instructed to call the office immediately if the following severe adverse effects occur:  hearing changes, easy bruising/bleeding, severe headache, or vision changes.  The patient verbalized understanding of the proper use and possible adverse effects of tetracycline.  All of the patient's questions and concerns were addressed. Patient understands to avoid pregnancy while on therapy due to potential birth defects.
Aklief counseling:  Patient advised to apply a pea-sized amount only at bedtime and wait 30 minutes after washing their face before applying.  If too drying, patient may add a non-comedogenic moisturizer.  The most commonly reported side effects including irritation, redness, scaling, dryness, stinging, burning, itching, and increased risk of sunburn.  The patient verbalized understanding of the proper use and possible adverse effects of retinoids.  All of the patient's questions and concerns were addressed.
Spironolactone Counseling: Patient advised regarding risks of diarrhea, abdominal pain, hyperkalemia, birth defects (for female patients), liver toxicity and renal toxicity. The patient may need blood work to monitor liver and kidney function and potassium levels while on therapy. The patient verbalized understanding of the proper use and possible adverse effects of spironolactone.  All of the patient's questions and concerns were addressed.
Topical Retinoid Pregnancy And Lactation Text: This medication is Pregnancy Category C. It is unknown if this medication is excreted in breast milk.
Use Enhanced Medication Counseling?: No
Include Pregnancy/Lactation Warning?: Add Automatically Based on Childbearing Potential and Patient Age
Tazorac Pregnancy And Lactation Text: This medication is not safe during pregnancy. It is unknown if this medication is excreted in breast milk.
Birth Control Pills Pregnancy And Lactation Text: This medication should be avoided if pregnant and for the first 30 days post-partum.
Bactrim Pregnancy And Lactation Text: This medication is Pregnancy Category D and is known to cause fetal risk.  It is also excreted in breast milk.
Sarecycline Counseling: Patient advised regarding possible photosensitivity and discoloration of the teeth, skin, lips, tongue and gums.  Patient instructed to avoid sunlight, if possible.  When exposed to sunlight, patients should wear protective clothing, sunglasses, and sunscreen.  The patient was instructed to call the office immediately if the following severe adverse effects occur:  hearing changes, easy bruising/bleeding, severe headache, or vision changes.  The patient verbalized understanding of the proper use and possible adverse effects of sarecycline.  All of the patient's questions and concerns were addressed.
Azithromycin Pregnancy And Lactation Text: This medication is considered safe during pregnancy and is also secreted in breast milk.
Minocycline Counseling: Patient advised regarding possible photosensitivity and discoloration of the teeth, skin, lips, tongue and gums.  Patient instructed to avoid sunlight, if possible.  When exposed to sunlight, patients should wear protective clothing, sunglasses, and sunscreen.  The patient was instructed to call the office immediately if the following severe adverse effects occur:  hearing changes, easy bruising/bleeding, severe headache, or vision changes.  The patient verbalized understanding of the proper use and possible adverse effects of minocycline.  All of the patient's questions and concerns were addressed.
Benzoyl Peroxide Pregnancy And Lactation Text: This medication is Pregnancy Category C. It is unknown if benzoyl peroxide is excreted in breast milk.
Aklief Pregnancy And Lactation Text: It is unknown if this medication is safe to use during pregnancy.  It is unknown if this medication is excreted in breast milk.  Breastfeeding women should use the topical cream on the smallest area of the skin for the shortest time needed while breastfeeding.  Do not apply to nipple and areola.
High Dose Vitamin A Counseling: Side effects reviewed, pt to contact office should one occur.
Azelaic Acid Pregnancy And Lactation Text: This medication is considered safe during pregnancy and breast feeding.
Isotretinoin Counseling: Patient should get monthly blood tests, not donate blood, not drive at night if vision affected, not share medication, and not undergo elective surgery for 6 months after tx completed. Side effects reviewed, pt to contact office should one occur.
Erythromycin Counseling:  I discussed with the patient the risks of erythromycin including but not limited to GI upset, allergic reaction, drug rash, diarrhea, increase in liver enzymes, and yeast infections.
Topical Sulfur Applications Counseling: Topical Sulfur Counseling: Patient counseled that this medication may cause skin irritation or allergic reactions.  In the event of skin irritation, the patient was advised to reduce the amount of the drug applied or use it less frequently.   The patient verbalized understanding of the proper use and possible adverse effects of topical sulfur application.  All of the patient's questions and concerns were addressed.
Winlevi Counseling:  I discussed with the patient the risks of topical clascoterone including but not limited to erythema, scaling, itching, and stinging. Patient voiced their understanding.
Spironolactone Pregnancy And Lactation Text: This medication can cause feminization of the male fetus and should be avoided during pregnancy. The active metabolite is also found in breast milk.

## 2025-07-31 NOTE — PROCEDURE: SUNSCREEN RECOMMENDATIONS

## 2025-07-31 NOTE — PROCEDURE: SHAVE REMOVAL
Medical Necessity Information: It is in your best interest to select a reason for this procedure from the list below. All of these items fulfill various CMS LCD requirements except the new and changing color options.
Medical Necessity Clause: This procedure was medically necessary because the lesion that was treated was:
Lab: [id number]
Lab Facility: [id number]
Detail Level: Detailed
Was A Bandage Applied: Yes
Size Of Lesion In Cm (Required): 0.8
X Size Of Lesion In Cm (Optional): 0
Depth Of Shave: dermis
Biopsy Method: Dermablade
Anesthesia Type: 1% lidocaine with epinephrine
Anesthesia Volume In Cc: 0.3
Hemostasis: Aluminum Chloride
Wound Care: Vaseline
Render Path Notes In Note?: No
Consent was obtained from the patient. The risks and benefits to therapy were discussed in detail. Specifically, the risks of infection, scarring, bleeding, prolonged wound healing, incomplete removal, allergy to anesthesia, nerve injury and recurrence were addressed. Prior to the procedure, the treatment site was clearly identified and confirmed by the patient. All components of Universal Protocol/PAUSE Rule completed.
Post-Care Instructions: I reviewed with the patient in detail post-care instructions. Patient is to keep the biopsy site dry overnight, and then apply bacitracin twice daily until healed. Patient may apply hydrogen peroxide soaks to remove any crusting.
Notification Instructions: Patient will be notified of pathology results. However, patient instructed to call the office if not contacted within 2 weeks.
Billing Type: Third-Party Bill

## 2025-08-07 ENCOUNTER — APPOINTMENT (OUTPATIENT)
Dept: URBAN - METROPOLITAN AREA CLINIC 55 | Facility: CLINIC | Age: 45
Setting detail: DERMATOLOGY
End: 2025-08-07

## 2025-08-07 DIAGNOSIS — Z01.818 ENCOUNTER FOR OTHER PREPROCEDURAL EXAMINATION: ICD-10-CM

## 2025-08-07 PROCEDURE — ? COUNSELING
